# Patient Record
Sex: FEMALE | Race: ASIAN | Employment: OTHER | URBAN - METROPOLITAN AREA
[De-identification: names, ages, dates, MRNs, and addresses within clinical notes are randomized per-mention and may not be internally consistent; named-entity substitution may affect disease eponyms.]

---

## 2018-11-23 ENCOUNTER — ED HISTORICAL/CONVERTED ENCOUNTER (OUTPATIENT)
Dept: OTHER | Age: 83
End: 2018-11-23

## 2021-10-13 ENCOUNTER — APPOINTMENT (OUTPATIENT)
Dept: GENERAL RADIOLOGY | Age: 86
DRG: 871 | End: 2021-10-13
Attending: STUDENT IN AN ORGANIZED HEALTH CARE EDUCATION/TRAINING PROGRAM
Payer: MEDICARE

## 2021-10-13 ENCOUNTER — HOSPITAL ENCOUNTER (INPATIENT)
Age: 86
LOS: 2 days | Discharge: LEFT BEFORE DISCHARGE | DRG: 871 | End: 2021-10-15
Attending: INTERNAL MEDICINE | Admitting: INTERNAL MEDICINE
Payer: MEDICARE

## 2021-10-13 DIAGNOSIS — N30.00 ACUTE CYSTITIS WITHOUT HEMATURIA: Primary | ICD-10-CM

## 2021-10-13 PROBLEM — G93.41 METABOLIC ENCEPHALOPATHY: Status: ACTIVE | Noted: 2021-10-13

## 2021-10-13 PROBLEM — N39.0 UTI (URINARY TRACT INFECTION): Status: ACTIVE | Noted: 2021-10-13

## 2021-10-13 PROBLEM — A41.9 SEPSIS (HCC): Status: ACTIVE | Noted: 2021-10-13

## 2021-10-13 PROBLEM — G93.49 INFECTIOUS ENCEPHALOPATHY: Status: ACTIVE | Noted: 2021-10-13

## 2021-10-13 PROBLEM — B99.9 INFECTIOUS ENCEPHALOPATHY: Status: ACTIVE | Noted: 2021-10-13

## 2021-10-13 PROBLEM — T50.B95A: Status: ACTIVE | Noted: 2021-10-13

## 2021-10-13 PROBLEM — R41.82 ALTERED MENTAL STATUS: Status: ACTIVE | Noted: 2021-10-13

## 2021-10-13 LAB
ALBUMIN SERPL-MCNC: 3 G/DL (ref 3.5–5)
ALBUMIN/GLOB SERPL: 0.6 {RATIO} (ref 1.1–2.2)
ALP SERPL-CCNC: 79 U/L (ref 45–117)
ALT SERPL-CCNC: 25 U/L (ref 12–78)
ANION GAP SERPL CALC-SCNC: 7 MMOL/L (ref 5–15)
APPEARANCE UR: CLEAR
AST SERPL W P-5'-P-CCNC: 64 U/L (ref 15–37)
ATRIAL RATE: 111 BPM
BACTERIA URNS QL MICRO: NEGATIVE /HPF
BASOPHILS # BLD: 0 K/UL (ref 0–0.1)
BASOPHILS NFR BLD: 0 % (ref 0–1)
BILIRUB SERPL-MCNC: 2.1 MG/DL (ref 0.2–1)
BILIRUB UR QL: NEGATIVE
BUN SERPL-MCNC: 13 MG/DL (ref 6–20)
BUN/CREAT SERPL: 18 (ref 12–20)
CA-I BLD-MCNC: 9 MG/DL (ref 8.5–10.1)
CALCULATED R AXIS, ECG10: 35 DEGREES
CALCULATED T AXIS, ECG11: 142 DEGREES
CHLORIDE SERPL-SCNC: 111 MMOL/L (ref 97–108)
CO2 SERPL-SCNC: 24 MMOL/L (ref 21–32)
COLOR UR: ABNORMAL
COVID-19 RAPID TEST, COVR: NOT DETECTED
CREAT SERPL-MCNC: 0.71 MG/DL (ref 0.55–1.02)
CRP SERPL-MCNC: 5.88 MG/DL (ref 0–0.6)
D DIMER PPP FEU-MCNC: 1.13 UG/ML(FEU)
DIAGNOSIS, 93000: NORMAL
DIFFERENTIAL METHOD BLD: ABNORMAL
DIGOXIN SERPL-MCNC: 0.5 NG/ML (ref 0.9–2)
EOSINOPHIL # BLD: 0 K/UL (ref 0–0.4)
EOSINOPHIL NFR BLD: 0 % (ref 0–7)
ERYTHROCYTE [DISTWIDTH] IN BLOOD BY AUTOMATED COUNT: 14.6 % (ref 11.5–14.5)
FLUAV AG NPH QL IA: NEGATIVE
FLUBV AG NOSE QL IA: NEGATIVE
GLOBULIN SER CALC-MCNC: 4.7 G/DL (ref 2–4)
GLUCOSE SERPL-MCNC: 116 MG/DL (ref 65–100)
GLUCOSE UR STRIP.AUTO-MCNC: NEGATIVE MG/DL
HCT VFR BLD AUTO: 33.5 % (ref 35–47)
HGB BLD-MCNC: 10.8 G/DL (ref 11.5–16)
HGB UR QL STRIP: NEGATIVE
IMM GRANULOCYTES # BLD AUTO: 0.1 K/UL (ref 0–0.04)
IMM GRANULOCYTES NFR BLD AUTO: 1 % (ref 0–0.5)
KETONES UR QL STRIP.AUTO: NEGATIVE MG/DL
LACTATE SERPL-SCNC: 1.5 MMOL/L (ref 0.4–2)
LEUKOCYTE ESTERASE UR QL STRIP.AUTO: ABNORMAL
LYMPHOCYTES # BLD: 0.4 K/UL (ref 0.8–3.5)
LYMPHOCYTES NFR BLD: 3 % (ref 12–49)
MCH RBC QN AUTO: 31.8 PG (ref 26–34)
MCHC RBC AUTO-ENTMCNC: 32.2 G/DL (ref 30–36.5)
MCV RBC AUTO: 98.5 FL (ref 80–99)
MONOCYTES # BLD: 0.9 K/UL (ref 0–1)
MONOCYTES NFR BLD: 6 % (ref 5–13)
MUCOUS THREADS URNS QL MICRO: ABNORMAL /LPF
NEUTS SEG # BLD: 12.7 K/UL (ref 1.8–8)
NEUTS SEG NFR BLD: 90 % (ref 32–75)
NITRITE UR QL STRIP.AUTO: NEGATIVE
NRBC # BLD: 0 K/UL (ref 0–0.01)
NRBC BLD-RTO: 0 PER 100 WBC
PH UR STRIP: 6 [PH] (ref 5–8)
PLATELET # BLD AUTO: 225 K/UL (ref 150–400)
PMV BLD AUTO: 9.7 FL (ref 8.9–12.9)
POTASSIUM SERPL-SCNC: 3.7 MMOL/L (ref 3.5–5.1)
PROT SERPL-MCNC: 7.7 G/DL (ref 6.4–8.2)
PROT UR STRIP-MCNC: NEGATIVE MG/DL
Q-T INTERVAL, ECG07: 288 MS
QRS DURATION, ECG06: 88 MS
QTC CALCULATION (BEZET), ECG08: 398 MS
RBC # BLD AUTO: 3.4 M/UL (ref 3.8–5.2)
RBC #/AREA URNS HPF: ABNORMAL /HPF (ref 0–5)
SODIUM SERPL-SCNC: 142 MMOL/L (ref 136–145)
SP GR UR REFRACTOMETRY: 1.01 (ref 1–1.03)
SPECIMEN SOURCE: NORMAL
UA: UC IF INDICATED,UAUC: ABNORMAL
UROBILINOGEN UR QL STRIP.AUTO: 2 EU/DL (ref 0.1–1)
VENTRICULAR RATE, ECG03: 115 BPM
WBC # BLD AUTO: 14.1 K/UL (ref 3.6–11)
WBC URNS QL MICRO: ABNORMAL /HPF (ref 0–4)

## 2021-10-13 PROCEDURE — 36415 COLL VENOUS BLD VENIPUNCTURE: CPT

## 2021-10-13 PROCEDURE — 99285 EMERGENCY DEPT VISIT HI MDM: CPT

## 2021-10-13 PROCEDURE — 85379 FIBRIN DEGRADATION QUANT: CPT

## 2021-10-13 PROCEDURE — 74011000258 HC RX REV CODE- 258: Performed by: INTERNAL MEDICINE

## 2021-10-13 PROCEDURE — 80162 ASSAY OF DIGOXIN TOTAL: CPT

## 2021-10-13 PROCEDURE — 81001 URINALYSIS AUTO W/SCOPE: CPT

## 2021-10-13 PROCEDURE — 74011250637 HC RX REV CODE- 250/637: Performed by: INTERNAL MEDICINE

## 2021-10-13 PROCEDURE — 74011250636 HC RX REV CODE- 250/636: Performed by: INTERNAL MEDICINE

## 2021-10-13 PROCEDURE — 87635 SARS-COV-2 COVID-19 AMP PRB: CPT

## 2021-10-13 PROCEDURE — 80053 COMPREHEN METABOLIC PANEL: CPT

## 2021-10-13 PROCEDURE — 74011250636 HC RX REV CODE- 250/636: Performed by: STUDENT IN AN ORGANIZED HEALTH CARE EDUCATION/TRAINING PROGRAM

## 2021-10-13 PROCEDURE — 86140 C-REACTIVE PROTEIN: CPT

## 2021-10-13 PROCEDURE — 71045 X-RAY EXAM CHEST 1 VIEW: CPT

## 2021-10-13 PROCEDURE — 74011250636 HC RX REV CODE- 250/636: Performed by: PHYSICIAN ASSISTANT

## 2021-10-13 PROCEDURE — 65270000029 HC RM PRIVATE

## 2021-10-13 PROCEDURE — 74011250637 HC RX REV CODE- 250/637: Performed by: STUDENT IN AN ORGANIZED HEALTH CARE EDUCATION/TRAINING PROGRAM

## 2021-10-13 PROCEDURE — 87804 INFLUENZA ASSAY W/OPTIC: CPT

## 2021-10-13 PROCEDURE — 87040 BLOOD CULTURE FOR BACTERIA: CPT

## 2021-10-13 PROCEDURE — 83605 ASSAY OF LACTIC ACID: CPT

## 2021-10-13 PROCEDURE — 87086 URINE CULTURE/COLONY COUNT: CPT

## 2021-10-13 PROCEDURE — 93005 ELECTROCARDIOGRAM TRACING: CPT

## 2021-10-13 PROCEDURE — 85025 COMPLETE CBC W/AUTO DIFF WBC: CPT

## 2021-10-13 RX ORDER — FAMOTIDINE 20 MG/1
20 TABLET, FILM COATED ORAL 2 TIMES DAILY
Status: DISCONTINUED | OUTPATIENT
Start: 2021-10-13 | End: 2021-10-15 | Stop reason: HOSPADM

## 2021-10-13 RX ORDER — SAME BUTANEDISULFONATE/BETAINE 400-600 MG
250 POWDER IN PACKET (EA) ORAL 2 TIMES DAILY
Status: DISCONTINUED | OUTPATIENT
Start: 2021-10-13 | End: 2021-10-15 | Stop reason: HOSPADM

## 2021-10-13 RX ORDER — SODIUM CHLORIDE 0.9 % (FLUSH) 0.9 %
5-40 SYRINGE (ML) INJECTION EVERY 8 HOURS
Status: DISCONTINUED | OUTPATIENT
Start: 2021-10-13 | End: 2021-10-15 | Stop reason: HOSPADM

## 2021-10-13 RX ORDER — ACETAMINOPHEN 650 MG/1
650 SUPPOSITORY RECTAL
Status: DISCONTINUED | OUTPATIENT
Start: 2021-10-13 | End: 2021-10-15 | Stop reason: HOSPADM

## 2021-10-13 RX ORDER — HYDRALAZINE HYDROCHLORIDE 20 MG/ML
10 INJECTION INTRAMUSCULAR; INTRAVENOUS
Status: DISCONTINUED | OUTPATIENT
Start: 2021-10-13 | End: 2021-10-15 | Stop reason: HOSPADM

## 2021-10-13 RX ORDER — MAG HYDROX/ALUMINUM HYD/SIMETH 200-200-20
30 SUSPENSION, ORAL (FINAL DOSE FORM) ORAL
Status: DISCONTINUED | OUTPATIENT
Start: 2021-10-13 | End: 2021-10-15 | Stop reason: HOSPADM

## 2021-10-13 RX ORDER — ENOXAPARIN SODIUM 100 MG/ML
40 INJECTION SUBCUTANEOUS DAILY
Status: DISCONTINUED | OUTPATIENT
Start: 2021-10-14 | End: 2021-10-15 | Stop reason: HOSPADM

## 2021-10-13 RX ORDER — SODIUM CHLORIDE 0.9 % (FLUSH) 0.9 %
5-40 SYRINGE (ML) INJECTION AS NEEDED
Status: DISCONTINUED | OUTPATIENT
Start: 2021-10-13 | End: 2021-10-15 | Stop reason: HOSPADM

## 2021-10-13 RX ORDER — ONDANSETRON 4 MG/1
4 TABLET, ORALLY DISINTEGRATING ORAL
Status: DISCONTINUED | OUTPATIENT
Start: 2021-10-13 | End: 2021-10-15 | Stop reason: HOSPADM

## 2021-10-13 RX ORDER — ACETAMINOPHEN 650 MG/1
975 SUPPOSITORY RECTAL ONCE
Status: COMPLETED | OUTPATIENT
Start: 2021-10-13 | End: 2021-10-13

## 2021-10-13 RX ORDER — SODIUM CHLORIDE 9 MG/ML
100 INJECTION, SOLUTION INTRAVENOUS CONTINUOUS
Status: DISCONTINUED | OUTPATIENT
Start: 2021-10-13 | End: 2021-10-15 | Stop reason: HOSPADM

## 2021-10-13 RX ORDER — POLYETHYLENE GLYCOL 3350 17 G/17G
17 POWDER, FOR SOLUTION ORAL DAILY PRN
Status: DISCONTINUED | OUTPATIENT
Start: 2021-10-13 | End: 2021-10-15 | Stop reason: HOSPADM

## 2021-10-13 RX ORDER — ONDANSETRON 2 MG/ML
4 INJECTION INTRAMUSCULAR; INTRAVENOUS
Status: DISCONTINUED | OUTPATIENT
Start: 2021-10-13 | End: 2021-10-15 | Stop reason: HOSPADM

## 2021-10-13 RX ORDER — ACETAMINOPHEN 325 MG/1
650 TABLET ORAL
Status: DISCONTINUED | OUTPATIENT
Start: 2021-10-13 | End: 2021-10-15 | Stop reason: HOSPADM

## 2021-10-13 RX ORDER — LANOLIN ALCOHOL/MO/W.PET/CERES
3 CREAM (GRAM) TOPICAL
Status: DISCONTINUED | OUTPATIENT
Start: 2021-10-13 | End: 2021-10-15 | Stop reason: HOSPADM

## 2021-10-13 RX ADMIN — Medication 10 ML: at 20:02

## 2021-10-13 RX ADMIN — SODIUM CHLORIDE 1000 ML: 9 INJECTION, SOLUTION INTRAVENOUS at 08:26

## 2021-10-13 RX ADMIN — Medication 250 MG: at 20:01

## 2021-10-13 RX ADMIN — PIPERACILLIN AND TAZOBACTAM 3.38 G: 3; .375 INJECTION, POWDER, LYOPHILIZED, FOR SOLUTION INTRAVENOUS at 14:03

## 2021-10-13 RX ADMIN — PIPERACILLIN AND TAZOBACTAM 3.38 G: 3; .375 INJECTION, POWDER, LYOPHILIZED, FOR SOLUTION INTRAVENOUS at 21:46

## 2021-10-13 RX ADMIN — SODIUM CHLORIDE 1000 ML: 9 INJECTION, SOLUTION INTRAVENOUS at 10:45

## 2021-10-13 RX ADMIN — Medication 10 ML: at 21:46

## 2021-10-13 RX ADMIN — FAMOTIDINE 20 MG: 20 TABLET, FILM COATED ORAL at 20:01

## 2021-10-13 RX ADMIN — Medication 10 ML: at 14:01

## 2021-10-13 RX ADMIN — SODIUM CHLORIDE 100 ML/HR: 9 INJECTION, SOLUTION INTRAVENOUS at 14:01

## 2021-10-13 RX ADMIN — ACETAMINOPHEN 975 MG: 650 SUPPOSITORY RECTAL at 08:26

## 2021-10-13 NOTE — ACP (ADVANCE CARE PLANNING)
Advance Care Planning   Healthcare Decision Maker:       Primary Decision Maker: [facility] - [phone]

## 2021-10-13 NOTE — H&P
History & Physical    Primary Care Provider: None  Source of Information: Patient/Family/records    History of Presenting Illness:   Cathy Christy is a 81 y.o. female who presents with a history of atrial fibrillation who presents to the emergency department short of breath, febrile with altered mentation. Patient is alert now though quite weak. She is seemingly lucid though limited information is gathered from her and she asked me to call her  for information as she does not feel like talking. I spoke with her son at home who states that she was doing fine through yesterday. She got her second dose of COVID-19 vaccine apparently went to bed okay. Son states that he saw her eating a sweet potato at approximately 2:00 in the morning and then at about 5:55 in the morning encountered her short of breath and confused and this is when they called EMS. She was found to have hypoxia with SPO2 around 85% reportedly prior to hospitalization and here in the ED her O2 sat was 91% on room air. She is now on 2 L per nasal cannula saturating above 96%. She had a fever of 102.7 here initially. She denies chest pain, shortness of breath, abdominal pain, nausea, vomiting, diarrhea or headache. Labs reveal a white count of 14. 1,Potassium of 3.7, BUN/creatinine of 13 and 0.1 respectively with a marginal increase of AST to 64 and bili to 2.1. Her chest x-ray is unrevealing for acute cardiopulmonary process. Influenza a and B- as was COVID-19 rapid test not detected. Urine analysis is suggestive of UTI and in lieu of her fever, sepsis versus SIRS, likely Covid vaccination side effects, she is being admitted for further evaluation and management. Patient denies any chest pain, wheezing. She was well up until this morning. In speaking with family members, they deny any history of cardiac disease or irregular rhythms and cannot tell me why she is on digoxin.  PCPs office is closed currently and will try again in order to obtain further information on her history and current medications. Review of Systems:  A comprehensive review of systems was negative except for that written in the History of Present Illness. No past medical history on file. Family denies heart disease, hypertension  No past surgical history on file. Unknown yet  Prior to Admission medications    Not yet confirmed, reportedly on digoxin, lasix, potassium supplements. No Known Allergies   No family history on file. Unknown yet. SOCIAL HISTORY:  Patient resides:  Independently x   Assisted Living    SNF    With family care       Smoking history:   None x   Former    Chronic      Alcohol history:   None x   Social    Chronic      Ambulates:   Independently x   w/cane    w/walker    w/wc    CODE STATUS:  DNR    Full x   Other      Objective:     Physical Exam:     Visit Vitals  /62   Pulse 91   Temp 97.6 °F (36.4 °C)   Resp 24   Ht 5' 4\" (1.626 m)   Wt 72.6 kg (160 lb)   SpO2 97%   BMI 27.46 kg/m²    O2 Flow Rate (L/min): 2 l/min O2 Device: Nasal cannula    General:   Appears somnolent, quite weak, not distressed. She is interacting with me appropriately just very weak   Head:  Normocephalic, without obvious abnormality, atraumatic. Eyes:  Conjunctivae/corneas clear. EOMs intact. Throat:  Dry oral mucosa   Neck: Supple, symmetrical, trachea midline, no adenopathy, thyroid: no enlargement/tenderness/nodules, no carotid bruit and no JVD. Lungs:   Clear to auscultation bilaterally. Not labored. Heart:  Regular rate and rhythm, S1, S2 normal, no murmur, click, rub or gallop. Abdomen:   Soft, non-tender. Bowel sounds normal. No masses,  No organomegaly. Extremities: Extremities normal, atraumatic, no cyanosis or edema. Pulses: 2+ and symmetric all extremities. Skin:  Warm, dry, decreased turgor   Neurologic: CNII-XII intact. No motor or sensory deficits.       Data Review:     Recent Days:  Recent Labs 10/13/21  0935   WBC 14.1*   HGB 10.8*   HCT 33.5*        Recent Labs     10/13/21  0826      K 3.7   *   CO2 24   *   BUN 13   CREA 0.71   CA 9.0   ALB 3.0*   TBILI 2.1*   ALT 25     No results for input(s): PH, PCO2, PO2, HCO3, FIO2 in the last 72 hours. 24 Hour Results:  Recent Results (from the past 24 hour(s))   METABOLIC PANEL, COMPREHENSIVE    Collection Time: 10/13/21  8:26 AM   Result Value Ref Range    Sodium 142 136 - 145 mmol/L    Potassium 3.7 3.5 - 5.1 mmol/L    Chloride 111 (H) 97 - 108 mmol/L    CO2 24 21 - 32 mmol/L    Anion gap 7 5 - 15 mmol/L    Glucose 116 (H) 65 - 100 mg/dL    BUN 13 6 - 20 mg/dL    Creatinine 0.71 0.55 - 1.02 mg/dL    BUN/Creatinine ratio 18 12 - 20      GFR est AA >60 >60 ml/min/1.73m2    GFR est non-AA >60 >60 ml/min/1.73m2    Calcium 9.0 8.5 - 10.1 mg/dL    Bilirubin, total 2.1 (H) 0.2 - 1.0 mg/dL    AST (SGOT) 64 (H) 15 - 37 U/L    ALT (SGPT) 25 12 - 78 U/L    Alk.  phosphatase 79 45 - 117 U/L    Protein, total 7.7 6.4 - 8.2 g/dL    Albumin 3.0 (L) 3.5 - 5.0 g/dL    Globulin 4.7 (H) 2.0 - 4.0 g/dL    A-G Ratio 0.6 (L) 1.1 - 2.2     URINALYSIS W/ REFLEX CULTURE    Collection Time: 10/13/21  8:26 AM    Specimen: Urine   Result Value Ref Range    Color Yellow/Straw      Appearance Clear Clear      Specific gravity 1.012 1.003 - 1.030      pH (UA) 6.0 5.0 - 8.0      Protein Negative Negative mg/dL    Glucose Negative Negative mg/dL    Ketone Negative Negative mg/dL    Bilirubin Negative Negative      Blood Negative Negative      Urobilinogen 2.0 (H) 0.1 - 1.0 EU/dL    Nitrites Negative Negative      Leukocyte Esterase Moderate (A) Negative      UA:UC IF INDICATED Urine Culture Ordered (A) Culture not indicated by UA result      WBC 20-50 0 - 4 /hpf    RBC 0-5 0 - 5 /hpf    Bacteria Negative Negative /hpf    Mucus Trace /lpf   CULTURE, BLOOD, PAIRED    Collection Time: 10/13/21  8:26 AM    Specimen: Blood   Result Value Ref Range    Special Requests: No Special Requests      Culture result: No growth after 2 hours     LACTIC ACID    Collection Time: 10/13/21  8:26 AM   Result Value Ref Range    Lactic acid 1.5 0.4 - 2.0 mmol/L   INFLUENZA A & B AG (RAPID TEST)    Collection Time: 10/13/21  8:30 AM   Result Value Ref Range    Influenza A Antigen Negative Negative      Influenza B Antigen Negative Negative     COVID-19 RAPID TEST    Collection Time: 10/13/21  8:45 AM   Result Value Ref Range    Specimen source Nasopharyngeal      COVID-19 rapid test Not Detected Not Detected     CBC WITH AUTOMATED DIFF    Collection Time: 10/13/21  9:35 AM   Result Value Ref Range    WBC 14.1 (H) 3.6 - 11.0 K/uL    RBC 3.40 (L) 3.80 - 5.20 M/uL    HGB 10.8 (L) 11.5 - 16.0 g/dL    HCT 33.5 (L) 35.0 - 47.0 %    MCV 98.5 80.0 - 99.0 FL    MCH 31.8 26.0 - 34.0 PG    MCHC 32.2 30.0 - 36.5 g/dL    RDW 14.6 (H) 11.5 - 14.5 %    PLATELET 545 983 - 167 K/uL    MPV 9.7 8.9 - 12.9 FL    NRBC 0.0 0.0  WBC    ABSOLUTE NRBC 0.00 0.00 - 0.01 K/uL    NEUTROPHILS 90 (H) 32 - 75 %    LYMPHOCYTES 3 (L) 12 - 49 %    MONOCYTES 6 5 - 13 %    EOSINOPHILS 0 0 - 7 %    BASOPHILS 0 0 - 1 %    IMMATURE GRANULOCYTES 1 (H) 0 - 0.5 %    ABS. NEUTROPHILS 12.7 (H) 1.8 - 8.0 K/UL    ABS. LYMPHOCYTES 0.4 (L) 0.8 - 3.5 K/UL    ABS. MONOCYTES 0.9 0.0 - 1.0 K/UL    ABS. EOSINOPHILS 0.0 0.0 - 0.4 K/UL    ABS. BASOPHILS 0.0 0.0 - 0.1 K/UL    ABS. IMM. GRANS. 0.1 (H) 0.00 - 0.04 K/UL    DF AUTOMATED           Imaging:     Assessment:     Active Problems:    UTI (urinary tract infection) (10/13/2021)      Altered mental status (10/13/2021)      Systemic adverse effect of COVID-19 vaccine (10/13/2021)         81-year-old female questionably with a history of atrial fibrillation now in sinus rhythm presents with altered mentation, shortness of breath, fever a day after she received her second Covid 19 vaccination shot. Concurrently, fever to 102.7 here and UA is suggestive of UTI.  She is profoundly weak, confusion seems to have dissipated. She does meet sepsis criteria with a white count of 14, heart rate greater than 90, fever of 102.7 and an apparent UTI. -Sepsis syndrome  -Probable UTI  -Suspect fever, shortness of breath, weakness secondary to her Covid-19-second shot on 10/12/2021.  -Family denies her having any history of irregular heart rhythms or cardiac disease though it appears she is on Lasix, digoxin, potassium supplements.  PCPs office is closed for lunch no further information currently.  -metabolic/infectious encephalopathy    Plan:     -IP admission anticipating at least a 2 night stay  -zosyn,   -follow ucs/blcs  -Judicious hydration  -Supportive care  -PT/OT and mobilize  -O2 as needed to maintain sats above 91%, wean as tolerated  -As needed albuterol  -Continue telemetry monitoring  -We will need to get a hold of PCP regarding medication reconciliation, current list of meds    VTEP: Lovenox  Full code  NOK:  Norma Harrington [phone]    Signed By: Henry Little MD     October 13, 2021

## 2021-10-13 NOTE — ED NOTES
TRANSFER - OUT REPORT:    Verbal report given to Donald Cruz RN(name) on [address]  being transferred to [facility](unit) for routine progression of care       Report consisted of patients Situation, Background, Assessment and   Recommendations(SBAR). Information from the following report(s) SBAR was reviewed with the receiving nurse. Lines:   Peripheral IV 10/13/21 Anterior;Right Forearm (Active)       Peripheral IV 10/13/21 Anterior; Left Hand (Active)        Opportunity for questions and clarification was provided.       Patient transported with:   [employer]

## 2021-10-13 NOTE — PROGRESS NOTES
Primary Nurse Abhay Edward RN and Pancho Villalta LPN  performed a dual skin assessment on this patient. Deon score is 20.

## 2021-10-13 NOTE — ACP (ADVANCE CARE PLANNING)
Advance Care Planning   Healthcare Decision Maker:       Primary Decision Maker: Gillian Gillette - Son - [phone]    Primary Decision Maker: Victoria Cheng - Spouse - [phone]

## 2021-10-13 NOTE — PROGRESS NOTES
10/13/21. CM spoke with pt's step son Marcia Colton @ [phone] - stated pt lives @ home with her  Yvonne Davila @ [phone]). Pt uses no DME & no home health. A family member to transport home upon discharge.

## 2021-10-13 NOTE — ED PROVIDER NOTES
EMERGENCY DEPARTMENT HISTORY AND PHYSICAL EXAM      Date: 10/13/2021  Patient Name: Lori Vences    History of Presenting Illness     Chief Complaint   Patient presents with    Altered mental status       History Provided By: Patient and EMS    HPI: Lori Vences is an 81 y.o. female with a past medical history significant for A fib who presents via EMS to the ED for evaluation of AMS. Pt's family report that she received her second dose of COVID vaccine yesterday as well as influenza vaccine, and shortly afterwards developed a fever, cough, fatigue, and malaise. Upon arrival pt is febrile to 102.7 F, tachypneic, and with an oxygen saturation of 91% on RA. She seems confused but able to articulate c/o low back pain and dysuria. She has no further complaints of pain and specifically denies any chest pain, shortness of breath, abdominal pain, nausea, vomit, diarrhea, or headache. There are no other complaints, changes, or physical findings at this time. PCP: None    No current facility-administered medications on file prior to encounter. No current outpatient medications on file prior to encounter. Past History     Past Medical History:  No past medical history on file. Past Surgical History:  No past surgical history on file. Family History:  No family history on file. Social History:  Social History     Tobacco Use    Smoking status: Not on file   Substance Use Topics    Alcohol use: Not on file    Drug use: Not on file       Allergies:  No Known Allergies      Review of Systems     Review of Systems   Constitutional: Positive for fatigue and fever. Negative for diaphoresis. Malaise   HENT: Negative. Negative for ear pain, rhinorrhea and trouble swallowing. Eyes: Negative. Negative for pain. Respiratory: Positive for cough. Negative for chest tightness and wheezing. Cardiovascular: Negative. Negative for chest pain, palpitations and leg swelling. Gastrointestinal: Negative. Negative for abdominal pain, constipation, diarrhea, nausea and vomiting. Genitourinary: Positive for dysuria. Negative for flank pain, hematuria and urgency. Musculoskeletal: Positive for back pain. Negative for neck pain and neck stiffness. Skin: Negative. Negative for rash. Neurological: Negative. Negative for dizziness, syncope, weakness, light-headedness, numbness and headaches. All other systems reviewed and are negative. Physical Exam     Physical Exam  Vitals and nursing note reviewed. Constitutional:       General: She is not in acute distress. Appearance: Normal appearance. She is ill-appearing. HENT:      Head: Normocephalic and atraumatic. Mouth/Throat:      Mouth: Mucous membranes are moist.      Pharynx: Oropharynx is clear. Eyes:      Extraocular Movements: Extraocular movements intact. Conjunctiva/sclera: Conjunctivae normal.      Pupils: Pupils are equal, round, and reactive to light. Cardiovascular:      Rate and Rhythm: Tachycardia present. Rhythm irregularly irregular. Heart sounds: Normal heart sounds. No murmur heard. No friction rub. No gallop. Pulmonary:      Effort: Tachypnea present. No respiratory distress. Breath sounds: Normal breath sounds. No wheezing, rhonchi or rales. Abdominal:      General: Bowel sounds are normal. There is no distension. Palpations: Abdomen is soft. Tenderness: There is no abdominal tenderness. There is no guarding or rebound. Musculoskeletal:      Cervical back: Neck supple. Right lower leg: No edema. Left lower leg: No edema. Lymphadenopathy:      Cervical: No cervical adenopathy. Skin:     General: Skin is warm and dry. Neurological:      General: No focal deficit present. Mental Status: She is alert. She is confused.    Psychiatric:         Mood and Affect: Mood normal.         Behavior: Behavior normal.         Lab and Diagnostic Study Results     Labs -     No results found for this or any previous visit (from the past 12 hour(s)). Radiologic Studies -   @lastxrresult@  CT Results  (Last 48 hours)    None        CXR Results  (Last 48 hours)    None            Medical Decision Making   - I am the first provider for this patient. - I reviewed the vital signs, available nursing notes, past medical history, past surgical history, family history and social history. - Initial assessment performed. The patients presenting problems have been discussed, and they are in agreement with the care plan formulated and outlined with them. I have encouraged them to ask questions as they arise throughout their visit. Vital Signs-Reviewed the patient's vital signs. No data found. Records Reviewed: Nursing Notes and Old Medical Records      ED Course:   10:26 AM  Pt reassessed and appears less confused and reports feeling a little better. She has been updated on all available results. Pt asking for some water to drink. Will order an additional liter of NS as well. She is no longer febrile but remains tachycardic and hypotensive. No new complaints or concerns. Resting comfortable in ED bed and A&Ox3. Consult Note:  12:49 PM  Claire Saldivar PA-C spoke with Dr. Vianey Marie,  internal medicine  Discussed pt's hx, disposition, and available diagnostic and imaging results. Reviewed care plans. Consultant agrees with plans and will admit the patient to their service. Provider Notes (Medical Decision Making):     MDM  Number of Diagnoses or Management Options  Acute cystitis without hematuria  Diagnosis management comments: 80 y/o female presents with fever and AMS, highly concerning for sepsis - suspected UTI as source. At this time, she has oxygen saturation in the low 90's on RA, is tachypneic and hypotensive. Will start IV fluids, antipyretics, and consult hospitalist for admission and empiric antibiotics.   Will watch closely with frequent reassessment. Have low suspicion for a GI, skin/soft tissue, or CNS source at this time, but will reconsider if initial workup is unremarkable. Will order basic labs, blood cultures, lactic acid, UA, CXR, EKG. Amount and/or Complexity of Data Reviewed  Clinical lab tests: ordered and reviewed  Tests in the radiology section of CPT®: ordered and reviewed  Tests in the medicine section of CPT®: ordered and reviewed  Review and summarize past medical records: yes  Discuss the patient with other providers: yes (Dr. Norma Sorensen)  Independent visualization of images, tracings, or specimens: yes           Procedures   Medical Decision Makingedical Decision Making  Performed by: Bruna Christine PA-C  PROCEDURES:  Procedures       Disposition   Disposition: Admitted to Floor Medical Floor the case was discussed with the admitting physician Dr. Carlos Taylor:  1. There are no discharge medications for this patient. 2.   Follow-up Information     Follow up With Specialties Details Why Contact Info    None    None ([phone]) Patient stated that they have no PCP          3. Return to ED if worse   4. Discharge Medication List as of 10/15/2021  6:26 PM      START taking these medications    Details   levoFLOXacin (LEVAQUIN) 500 mg tablet Take 1 Tablet by mouth daily for 7 days. Take initial dose tonight., Normal, Disp-7 Tablet, R-0      Saccharomyces boulardii (Florastor) 250 mg capsule Take 1 Capsule by mouth two (2) times a day for 7 days. , Normal, Disp-14 Capsule, R-0               Diagnosis     Clinical Impression:   1. Acute cystitis without hematuria        Attestations:    Bruna Christine PA-C    Please note that this dictation was completed with [employer], the [employer] voice recognition software. Quite often unanticipated grammatical, syntax, homophones, and other interpretive errors are inadvertently transcribed by the computer software. Please disregard these errors.   Please excuse any errors that have escaped final proofreading. Thank you.

## 2021-10-13 NOTE — PROGRESS NOTES
Reason for Admission:  UTI/AMS                     RUR Score:  9%                   Plan for utilizing home health: None @ this time/uses no DME. PCP: First and Last name:  Natalia Cm     Name of Practice:    Are you a current patient: Yes/No: Yes   Approximate date of last visit: Been a while since last visit. Can you participate in a virtual visit with your PCP:                   No  Current Advanced Directive/Advance Care Plan: Full Code      Healthcare Decision Maker:                Primary Decision Maker: Jackelin Blank - Son - [phone]    Primary Decision Maker: Linda Dominguez - Spouse - [phone]                  Transition of Care Plan:  D/C Plan is home with family & a family member will transport pt home.

## 2021-10-14 LAB
ALBUMIN SERPL-MCNC: 2.4 G/DL (ref 3.5–5)
ALBUMIN/GLOB SERPL: 0.7 {RATIO} (ref 1.1–2.2)
ALP SERPL-CCNC: 56 U/L (ref 45–117)
ALT SERPL-CCNC: 21 U/L (ref 12–78)
ANION GAP SERPL CALC-SCNC: 6 MMOL/L (ref 5–15)
AST SERPL W P-5'-P-CCNC: 33 U/L (ref 15–37)
BACTERIA SPEC CULT: ABNORMAL
BILIRUB DIRECT SERPL-MCNC: 0.7 MG/DL (ref 0–0.2)
BILIRUB SERPL-MCNC: 1 MG/DL (ref 0.2–1)
BUN SERPL-MCNC: 9 MG/DL (ref 6–20)
BUN/CREAT SERPL: 16 (ref 12–20)
CA-I BLD-MCNC: 7.6 MG/DL (ref 8.5–10.1)
CHLORIDE SERPL-SCNC: 112 MMOL/L (ref 97–108)
CO2 SERPL-SCNC: 24 MMOL/L (ref 21–32)
COLONY COUNT,CNT: 2000
COLONY COUNT,CNT: ABNORMAL
CREAT SERPL-MCNC: 0.56 MG/DL (ref 0.55–1.02)
ERYTHROCYTE [DISTWIDTH] IN BLOOD BY AUTOMATED COUNT: 14.9 % (ref 11.5–14.5)
GLOBULIN SER CALC-MCNC: 3.4 G/DL (ref 2–4)
GLUCOSE SERPL-MCNC: 88 MG/DL (ref 65–100)
HCT VFR BLD AUTO: 29 % (ref 35–47)
HGB BLD-MCNC: 9.3 G/DL (ref 11.5–16)
MAGNESIUM SERPL-MCNC: 2 MG/DL (ref 1.6–2.4)
MCH RBC QN AUTO: 32 PG (ref 26–34)
MCHC RBC AUTO-ENTMCNC: 32.1 G/DL (ref 30–36.5)
MCV RBC AUTO: 99.7 FL (ref 80–99)
NRBC # BLD: 0 K/UL (ref 0–0.01)
NRBC BLD-RTO: 0 PER 100 WBC
PLATELET # BLD AUTO: 191 K/UL (ref 150–400)
PMV BLD AUTO: 10.2 FL (ref 8.9–12.9)
POTASSIUM SERPL-SCNC: 3.5 MMOL/L (ref 3.5–5.1)
PROT SERPL-MCNC: 5.8 G/DL (ref 6.4–8.2)
RBC # BLD AUTO: 2.91 M/UL (ref 3.8–5.2)
SODIUM SERPL-SCNC: 142 MMOL/L (ref 136–145)
SPECIAL REQUESTS,SREQ: ABNORMAL
TSH SERPL DL<=0.05 MIU/L-ACNC: 3.33 UIU/ML (ref 0.36–3.74)
WBC # BLD AUTO: 9.5 K/UL (ref 3.6–11)

## 2021-10-14 PROCEDURE — 83735 ASSAY OF MAGNESIUM: CPT

## 2021-10-14 PROCEDURE — 97530 THERAPEUTIC ACTIVITIES: CPT

## 2021-10-14 PROCEDURE — 85027 COMPLETE CBC AUTOMATED: CPT

## 2021-10-14 PROCEDURE — 36415 COLL VENOUS BLD VENIPUNCTURE: CPT

## 2021-10-14 PROCEDURE — 65270000029 HC RM PRIVATE

## 2021-10-14 PROCEDURE — 84443 ASSAY THYROID STIM HORMONE: CPT

## 2021-10-14 PROCEDURE — 87040 BLOOD CULTURE FOR BACTERIA: CPT

## 2021-10-14 PROCEDURE — 80048 BASIC METABOLIC PNL TOTAL CA: CPT

## 2021-10-14 PROCEDURE — 80076 HEPATIC FUNCTION PANEL: CPT

## 2021-10-14 PROCEDURE — 74011000258 HC RX REV CODE- 258: Performed by: INTERNAL MEDICINE

## 2021-10-14 PROCEDURE — 97165 OT EVAL LOW COMPLEX 30 MIN: CPT

## 2021-10-14 PROCEDURE — 74011250637 HC RX REV CODE- 250/637: Performed by: INTERNAL MEDICINE

## 2021-10-14 PROCEDURE — 74011250636 HC RX REV CODE- 250/636: Performed by: INTERNAL MEDICINE

## 2021-10-14 PROCEDURE — 97161 PT EVAL LOW COMPLEX 20 MIN: CPT

## 2021-10-14 RX ADMIN — ACETAMINOPHEN 650 MG: 325 TABLET ORAL at 21:34

## 2021-10-14 RX ADMIN — PIPERACILLIN AND TAZOBACTAM 3.38 G: 3; .375 INJECTION, POWDER, LYOPHILIZED, FOR SOLUTION INTRAVENOUS at 13:20

## 2021-10-14 RX ADMIN — PIPERACILLIN AND TAZOBACTAM 3.38 G: 3; .375 INJECTION, POWDER, LYOPHILIZED, FOR SOLUTION INTRAVENOUS at 21:30

## 2021-10-14 RX ADMIN — PIPERACILLIN AND TAZOBACTAM 3.38 G: 3; .375 INJECTION, POWDER, LYOPHILIZED, FOR SOLUTION INTRAVENOUS at 05:02

## 2021-10-14 RX ADMIN — Medication 250 MG: at 08:22

## 2021-10-14 RX ADMIN — FAMOTIDINE 20 MG: 20 TABLET, FILM COATED ORAL at 21:30

## 2021-10-14 RX ADMIN — Medication 250 MG: at 21:30

## 2021-10-14 RX ADMIN — Medication 10 ML: at 13:20

## 2021-10-14 RX ADMIN — DOXYCYCLINE 100 MG: 100 INJECTION, POWDER, LYOPHILIZED, FOR SOLUTION INTRAVENOUS at 17:29

## 2021-10-14 RX ADMIN — Medication 10 ML: at 21:31

## 2021-10-14 RX ADMIN — FAMOTIDINE 20 MG: 20 TABLET, FILM COATED ORAL at 08:23

## 2021-10-14 RX ADMIN — ACETAMINOPHEN 650 MG: 325 TABLET ORAL at 15:21

## 2021-10-14 RX ADMIN — Medication 10 ML: at 05:06

## 2021-10-14 RX ADMIN — ENOXAPARIN SODIUM 40 MG: 40 INJECTION SUBCUTANEOUS at 08:23

## 2021-10-14 RX ADMIN — ACETAMINOPHEN 650 MG: 325 TABLET ORAL at 01:11

## 2021-10-14 NOTE — PROGRESS NOTES
Hospitalist Progress Note               Daily Progress Note: 10/14/2021      Subjective: The patient is seen for follow  up. Sitting up at bedside,  Subjectively better,  Afebrile,  Lucid,  bp trend improved. ucs prelim gnr  blcs prelim gpc in aerobic bottle. Problem List:  Problem List as of 10/14/2021 Date Reviewed: 10/13/2021        Codes Class Noted - Resolved    UTI (urinary tract infection) ICD-10-CM: N39.0  ICD-9-CM: 599.0  10/13/2021 - Present        Altered mental status ICD-10-CM: R41.82  ICD-9-CM: 780.97  10/13/2021 - Present        Systemic adverse effect of COVID-19 vaccine ICD-10-CM: T50. B95A  ICD-9-CM: E949.6  10/13/2021 - Present        * (Principal) Sepsis ([facility] [address] ICD-10-CM: A41.9  ICD-9-CM: 038.9, 995.91  10/13/2021 - Present        Infectious encephalopathy ICD-10-CM: G93.49, B99.9  ICD-9-CM: 348.39, 136.9  10/13/2021 - Present              Medications reviewed  Current Facility-Administered Medications   Medication Dose Route Frequency    sodium chloride (NS) flush 5-40 mL  5-40 mL IntraVENous Q8H    sodium chloride (NS) flush 5-40 mL  5-40 mL IntraVENous PRN    acetaminophen (TYLENOL) tablet 650 mg  650 mg Oral Q6H PRN    Or    acetaminophen (TYLENOL) suppository 650 mg  650 mg Rectal Q6H PRN    polyethylene glycol (MIRALAX) packet 17 g  17 g Oral DAILY PRN    ondansetron (ZOFRAN ODT) tablet 4 mg  4 mg Oral Q8H PRN    Or    ondansetron (ZOFRAN) injection 4 mg  4 mg IntraVENous Q6H PRN    enoxaparin (LOVENOX) injection 40 mg  40 mg SubCUTAneous DAILY    piperacillin-tazobactam (ZOSYN) 3.375 g in 0.9% sodium chloride (MBP/ADV) 100 mL MBP  3.375 g IntraVENous Q8H    famotidine (PEPCID) tablet 20 mg  20 mg Oral BID    hydrALAZINE (APRESOLINE) 20 mg/mL injection 10 mg  10 mg IntraVENous Q6H PRN    alum-mag hydroxide-simeth (MYLANTA) oral suspension 30 mL  30 mL Oral Q4H PRN    Saccharomyces boulardii (FLORASTOR) capsule 250 mg  250 mg Oral BID    melatonin tablet 3 mg  3 mg Oral QHS PRN    0.9% sodium chloride infusion  100 mL/hr IntraVENous CONTINUOUS       Review of Systems:   A comprehensive review of systems was negative except for that written in the HPI. Objective:   Physical Exam:     Visit Vitals  BP (!) 140/82   Pulse 72   Temp 98.2 °F (36.8 °C)   Resp 18   Ht 5' 4\" (1.626 m)   Wt 72.6 kg (160 lb)   SpO2 99%   BMI 27.46 kg/m²    O2 Flow Rate (L/min): 2 l/min O2 Device: None (Room air)    Temp (24hrs), Av °F (36.7 °C), Min:97.5 °F (36.4 °C), Max:98.5 °F (36.9 °C)    No intake/output data recorded. 10/12 190 - 10/14 0700  In:  [I.V.:]  Out: -     General:   Much more alert and interactive, lucid, nad   Head:  Normocephalic, without obvious abnormality, atraumatic. Eyes:  Conjunctivae/corneas clear. EOMs intact. Throat:  Dry oral mucosa   Neck: Supple, symmetrical, trachea midline, no adenopathy, thyroid: no enlargement/tenderness/nodules, no carotid bruit and no JVD. Lungs:   Clear to auscultation bilaterally. Not labored. Heart:  Regular rate and rhythm, S1, S2 normal, no murmur, click, rub or gallop. Abdomen:   Soft, non-tender. Bowel sounds normal. No masses,  No organomegaly. Extremities: Extremities normal, atraumatic, no cyanosis or edema. Pulses: 2+ and symmetric all extremities. Skin:  Warm, dry, decreased turgor   Neurologic: CNII-XII intact. No motor or sensory deficits. Data Review:       Recent Days:  Recent Labs     10/14/21  0327 10/13/21  0935   WBC 9.5 14.1*   HGB 9.3* 10.8*   HCT 29.0* 33.5*    225     Recent Labs     10/14/21  0327 10/13/21  0826    142   K 3.5 3.7   * 111*   CO2 24 24   GLU 88 116*   BUN 9 13   CREA 0.56 0.71   CA 7.6* 9.0   MG 2.0  --    ALB 2.4* 3.0*   TBILI 1.0 2.1*   ALT 21 25     No results for input(s): PH, PCO2, PO2, HCO3, FIO2 in the last 72 hours.     24 Hour Results:  Recent Results (from the past 24 hour(s))   C REACTIVE PROTEIN, QT    Collection Time: 10/13/21  7:35 PM   Result Value Ref Range    C-Reactive protein 5.88 (H) 0.00 - 0.60 mg/dL   D DIMER    Collection Time: 10/13/21  7:35 PM   Result Value Ref Range    D DIMER 1.13 (H) <0.50 ug/ml(FEU)   DIGOXIN    Collection Time: 10/13/21  7:35 PM   Result Value Ref Range    Digoxin level 0.5 (L) 0.90 - 2.0 ng/mL   METABOLIC PANEL, BASIC    Collection Time: 10/14/21  3:27 AM   Result Value Ref Range    Sodium 142 136 - 145 mmol/L    Potassium 3.5 3.5 - 5.1 mmol/L    Chloride 112 (H) 97 - 108 mmol/L    CO2 24 21 - 32 mmol/L    Anion gap 6 5 - 15 mmol/L    Glucose 88 65 - 100 mg/dL    BUN 9 6 - 20 mg/dL    Creatinine 0.56 0.55 - 1.02 mg/dL    BUN/Creatinine ratio 16 12 - 20      GFR est AA >60 >60 ml/min/1.73m2    GFR est non-AA >60 >60 ml/min/1.73m2    Calcium 7.6 (L) 8.5 - 10.1 mg/dL   MAGNESIUM    Collection Time: 10/14/21  3:27 AM   Result Value Ref Range    Magnesium 2.0 1.6 - 2.4 mg/dL   CBC W/O DIFF    Collection Time: 10/14/21  3:27 AM   Result Value Ref Range    WBC 9.5 3.6 - 11.0 K/uL    RBC 2.91 (L) 3.80 - 5.20 M/uL    HGB 9.3 (L) 11.5 - 16.0 g/dL    HCT 29.0 (L) 35.0 - 47.0 %    MCV 99.7 (H) 80.0 - 99.0 FL    MCH 32.0 26.0 - 34.0 PG    MCHC 32.1 30.0 - 36.5 g/dL    RDW 14.9 (H) 11.5 - 14.5 %    PLATELET 538 545 - 263 K/uL    MPV 10.2 8.9 - 12.9 FL    NRBC 0.0 0.0  WBC    ABSOLUTE NRBC 0.00 0.00 - 0.01 K/uL   TSH 3RD GENERATION    Collection Time: 10/14/21  3:27 AM   Result Value Ref Range    TSH 3.33 0.36 - 3.74 uIU/mL   HEPATIC FUNCTION PANEL    Collection Time: 10/14/21  3:27 AM   Result Value Ref Range    Protein, total 5.8 (L) 6.4 - 8.2 g/dL    Albumin 2.4 (L) 3.5 - 5.0 g/dL    Globulin 3.4 2.0 - 4.0 g/dL    A-G Ratio 0.7 (L) 1.1 - 2.2      Bilirubin, total 1.0 0.2 - 1.0 mg/dL    Bilirubin, direct 0.7 (H) 0.0 - 0.2 mg/dL    Alk.  phosphatase 56 45 - 117 U/L    AST (SGOT) 33 15 - 37 U/L    ALT (SGPT) 21 12 - 78 U/L           Assessment/ Plan:     Patient Active Problem List   Diagnosis Code    UTI (urinary tract infection) N39.0    Altered mental status R41.82    Systemic adverse effect of COVID-19 vaccine T50. B95A    Sepsis ([facility] [address] A41.9    Infectious encephalopathy G93.49, B80.5       81-year-old female questionably with a history of atrial fibrillation now in sinus rhythm presents with altered mentation, shortness of breath, fever a day after she received her second Covid 19 vaccination shot. Concurrently, fever to 102.7 here and UA is suggestive of UTI. She is profoundly weak, confusion seems to have dissipated. She does meet sepsis criteria with a white count of 14, heart rate greater than 90, fever of 102.7 and an apparent UTI. Admitted for further management. Day 2 more alert and subjectively improving. UCS + GGNR and blcs growing gpc in aerobic bottle- cultures repeated. Continues zosyn started 10/13. Adding doxycycline pending blcs result. WBC descending. CRP initially 5.88.       -Sepsis syndrome, improving  -UTI- prelim gnr  -Bacteremia vs contaminant: blcs initial 10/13 reporting gpc in aerobic bottle. -Suspect covid vaccine(2nd shot) 10/12 contributed to presenting s/s.  -pta med list pending.  -metabolic/infectious encephalopathy resolving     Plan:        -zosyn, ucs pending  -add doxycycline, follow blcs  -Judicious hydration  -Supportive care  -PT/OT appreciated, mobilize    -O2 as needed to maintain sats above 91%, wean as tolerated, doing well on room air currently  -As needed albuterol  -Continue telemetry monitoring  -pta medlist pending     VTEP: Lovenox  Full code  NOK:  Giovanna Bernard [phone], updated telephonically. Care Plan discussed with: Patient/Family and Nurse    Total time spent with patient: 30 minutes. This dictation was done by dragon, computer voice recognition software. Often unanticipated grammatical, syntax, phones and other interpretive errors are inadvertently transcribed. Please excuse errors that have escaped final proofreading.     Josse Alcantara MD

## 2021-10-14 NOTE — PROGRESS NOTES
PHYSICAL THERAPY EVALUATION/DISCHARGE  Patient: Kathie Bush [de-identified]81 y.o. female)  Date: 10/14/2021  Primary Diagnosis: UTI (urinary tract infection) [N39.0]  Altered mental status [R41.82]       Precautions: standard       ASSESSMENT  Pt is an 80 yo female admitted on 10/13/2021 for SOB, febrile with AMS; pt currently being treated for sepsis syndrome, probable UTI. Per medical records pt received her 2nd COVID vaccine the day prior to onset of symptoms including fever, SOB and weakness. PMH: afib. Pt A&O x 4. Per pt report pt resides with  in a 1 story home with 4 LAURE, bilateral handrails, pt was I for ADLS/IADLS, no AD with mobility prior to admission. DME: none. Based on the objective data described below, the patient presents at functional baseline for mobility and amb. Pt semi-supine in bed upon PT arrival, agreeable to evaluation. Pt required mod I for bed mobility, supine > sit and sit <> stand transfers. Pt amb 175 feet in hallway with gt belt, no AD (pushing her IV pole), and mod I; demonstrates good pratibha with steady step through gt pattern with no LOB or knee buckling noted, pt did kick wheel of IV pole with foot was able to self correct. Area inspected by PT after sitting in chair, no redness or broken skin noted. Pt did well with session today, with no c/o dizziness, SOB or pain throughout session. Pt has no skilled acute PT needs at this time noted by PT or reported by pt, will DC skilled PT following evaluation; pt verbalized understanding and agreement.      Current Level of Function Impacting Discharge (mobility/balance): mod I to I    Other factors to consider for discharge: at baseline     PLAN :  Recommendations and Planned Interventions: DC from skilled PT services following evaluation    Frequency/Duration: Patient will be followed by physical therapy: DC from services following evaluation due to pt being at functional baseline; no skilled therapy required during admission, please reorder if needed     Recommendation for discharge: (in order for the patient to meet his/her long term goals)  [address]    This discharge recommendation:  Has been made in collaboration with the attending provider and/or case management    IF patient discharges home will need the following DME: none       SUBJECTIVE:   Patient stated i'm ready to go home.     OBJECTIVE DATA SUMMARY:   HISTORY:    No past medical history on file. No past surgical history on file. Home Situation  Home Environment: Private residence  # Steps to Enter: 4  Rails to Enter: Yes  Hand Rails : Bilateral  One/Two Story Residence: One story  Support Systems: Spouse/Significant Other  Current DME Used/Available at Home: None    EXAMINATION/PRESENTATION/DECISION MAKING:   Critical Behavior:  Neurologic State: Alert  Orientation Level: Oriented X4        Skin:  Intact where visible  Edema: none noted   Range Of Motion:  AROM: Within functional limits           PROM: Within functional limits           Strength:    Strength: Within functional limits                 Functional Mobility:  Bed Mobility:  Rolling: Modified independent  Supine to Sit: Modified independent     Scooting: Modified independent  Transfers:  Sit to Stand: Independent  Stand to Sit: Independent                       Balance:   Sitting: Intact; Without support  Standing: Intact; Without support  Ambulation/Gait Training:  Distance (ft): 175 Feet (ft)  Assistive Device: Gait belt  Ambulation - Level of Assistance: Modified independent     Gait Description (WDL): Exceptions to WDL                         Therapeutic Exercises:   Not completed this session    Functional Measure:  [facility] Mobility Inpatient Short Form  How much difficulty does the patient currently have. .. Unable A Lot A Little None   1. Turning over in bed (including adjusting bedclothes, sheets and blankets)? [] 1   [] 2   [] 3   [x] 4   2.   Sitting down on and standing up from a chair with arms ( e.g., wheelchair, bedside commode, etc.)   [] 1   [] 2   [] 3   [x] 4   3. Moving from lying on back to sitting on the side of the bed? [] 1   [] 2   [] 3   [x] 4          How much help from another person does the patient currently need. .. Total A Lot A Little None   4. Moving to and from a bed to a chair (including a wheelchair)? [] 1   [] 2   [] 3   [x] 4   5. Need to walk in hospital room? [] 1   [] 2   [] 3   [x] 4   6. Climbing 3-5 steps with a railing? [] 1   [] 2   [] 3   [x] 4   © , Trustees of [address] [place] [address] [address] [address], under license to [employer]. All rights reserved     Score:  Initial:  Most Recent: X (Date: 10/14/21)   Interpretation of Tool:  Represents activities that are increasingly more difficult (i.e. Bed mobility, Transfers, Gait). Score 24 23 22-20 19-15 14-10 9-7 6   Modifier CH CI CJ CK CL CM CN          Physical Therapy Evaluation Charge Determination   History Examination Presentation Decision-Making   HIGH Complexity :3+ comorbidities / personal factors will impact the outcome/ POC  HIGH Complexity : 4+ Standardized tests and measures addressing body structure, function, activity limitation and / or participation in recreation  LOW Complexity : Stable, uncomplicated  Other outcome measures [facility] 6  low      Based on the above components, the patient evaluation is determined to be of the following complexity level: LOW     Pain Ratin/10 reported     Activity Tolerance:   WNL, tolerates ADLs without rest breaks and SpO2 stable on RA; sats on RA 98%    After treatment patient left in no apparent distress:   Sitting in chair and Call bell within reach and nsg updated      COMMUNICATION/EDUCATION:   The patients plan of care was discussed with: Occupational therapist and Registered nurse.      Fall prevention education was provided and the patient/caregiver indicated understanding., Patient/family have participated as able in goal setting and plan of care. and Patient/family agree to work toward stated goals and plan of care. PT/OT sessions occurred together for increased safety of pt and clinician.     Thank you for this referral.  Jani Estrada, PT, DPT   Time Calculation: 19 mins

## 2021-10-14 NOTE — PROGRESS NOTES
OCCUPATIONAL THERAPY EVALUATION/DISCHARGE  Patient: Kathie Bush [de-identified]81 y.o. female)  Date: 10/14/2021  Primary Diagnosis: UTI (urinary tract infection) [N39.0]  Altered mental status [R41.82]       Precautions: standard       ASSESSMENT  Pt is an 80 yo female admitted on 10/13/2021 for SOB, febrile with AMS; pt currently being treated for sepsis syndrome, probable UTI. Per medical records pt received her 2nd COVID vaccine the day prior to onset of symptoms including fever, SOB and weakness. PMH: afib. Pt A&O x 4. Per pt report pt resides with  in a 1 story home with 4 LAURE, bilateral handrails, pt was I for ADLS/IADLS, no AD with mobility prior to admission. DME: none.     Based on the objective data described below, the patient presents at functional baseline for ADLs/functional transfers and mobility. Pt received semi-supine in bed upon arrival, agreeable to OT and PT evaluations. Pt required mod I for bed mobility, supine > sit and sit <> stand transfers. Mod I simulated LB dressing seated EOB. Mod I bathroom mobility with gait belt donned, no AD pushing IV pole with no LOB, and Mod I standing sink side completing oral care with good UE AROM/strength and coordination observed. Pt left resting comfortably in chair at end of evaluation today, IND with self feeding/bringing drink to mouth. Overall, pt tolerates session well today; pt is currently observed and reported to be at functional baseline for ADLs/mobility with no further skilled OT needs indicated at this time. OT to therefore d/c from OT caseload, pt in agreement. Recommend home with family/self care once medically appropriate. Other factors to consider for discharge: family support, IND at baseline      PLAN :  Recommendation for discharge: (in order for the patient to meet his/her long term goals)  No skilled occupational therapy/ follow up rehabilitation needs identified at this time.     This discharge recommendation:  Has been made in collaboration with the attending provider and/or case management    IF patient discharges home will need the following DME: none       SUBJECTIVE:   Patient stated I'm ready to go home.     OBJECTIVE DATA SUMMARY:   HISTORY:   No past medical history on file. No past surgical history on file. Expanded or extensive additional review of patient history:   Home Situation  Home Environment: Private residence  # Steps to Enter: 4  Rails to Enter: Yes  Hand Rails : Bilateral  One/Two Story Residence: One story  Support Systems: Spouse/Significant Other  Current DME Used/Available at Home: None    EXAMINATION OF PERFORMANCE DEFICITS:  Cognitive/Behavioral Status:  Neurologic State: Alert  Orientation Level: Oriented X4    Range of Motion:  AROM: Within functional limits  PROM: Within functional limits    Strength:  Strength: Within functional limits    Coordination:     Fine Motor Skills-Upper: Left Intact; Right Intact    Gross Motor Skills-Upper: Left Intact; Right Intact    Balance:  Sitting: Intact; Without support  Standing: Intact; Without support    Functional Mobility and Transfers for ADLs:  Bed Mobility:  Rolling: Modified independent  Supine to Sit: Modified independent  Scooting: Modified independent    Transfers:  Sit to Stand: Independent  Stand to Sit: Independent  Bed to Chair: Independent  Bathroom Mobility: Modified independent    ADL Intervention and task modifications:  Feeding  Drink to Mouth: Independent    Grooming  Grooming Assistance: Modified independent  Position Performed: Standing  Brushing Teeth: Modified independent    Lower Body Dressing Assistance  Socks: Modified independent  Position Performed: Seated edge of bed (Simulated)    Functional Measure:    [address] [facility] [address] [address] AM-PACTM \"6 Clicks\"                                                       Daily Activity Inpatient Short Form  How much help from another person does the patient currently need. .. Total; A Lot A Little None   1.   Putting on and taking off regular lower body clothing? []  1 []  2 []  3 [x]  4   2. Bathing (including washing, rinsing, drying)? []  1 []  2 []  3 [x]  4   3. Toileting, which includes using toilet, bedpan or urinal? [] 1 []  2 []  3 [x]  4   4. Putting on and taking off regular upper body clothing? []  1 []  2 []  3 [x]  4   5. Taking care of personal grooming such as brushing teeth? []  1 []  2 []  3 [x]  4   6. Eating meals? []  1 []  2 []  3 [x]  4   © , Trustees of [facility] [employer], under license to [employer]. All rights reserved     Score:      Interpretation of Tool:  Represents clinically-significant functional categories (i.e. Activities of daily living). Percentage of Impairment CH    0%   CI    1-19% CJ    20-39% CK    40-59% CL    60-79% CM    80-99% CN     100%   [facility]  Score 6-24 24 23 20-22 15-19 10-14 7-9 6       Occupational Therapy Evaluation Charge Determination   History Examination Decision-Making   LOW Complexity : Brief history review  LOW Complexity : 1-3 performance deficits relating to physical, cognitive , or psychosocial skils that result in activity limitations and / or participation restrictions  LOW Complexity : No comorbidities that affect functional and no verbal or physical assistance needed to complete eval tasks       Based on the above components, the patient evaluation is determined to be of the following complexity level: LOW   Pain Ratin/10    Activity Tolerance:   WNL. After treatment patient left in no apparent distress:    Sitting in chair and Call bell within reach    COMMUNICATION/EDUCATION:   The patients plan of care was discussed with: Physical therapist and Registered nurse.      OT/PT sessions occurred together for increased patient and clinician safety    Thank you for this referral.  Haseeb Rodriguez  Time Calculation: 20 mins

## 2021-10-14 NOTE — PROGRESS NOTES
She remains on Zosyn 3.375g antibiotics every 8 hours for Acute cystitis and urinary tract infection. Vital signs have been stable and devoid of distress. Her call light and fluids remain encouraged.

## 2021-10-15 VITALS
BODY MASS INDEX: 27.31 KG/M2 | HEIGHT: 64 IN | HEART RATE: 70 BPM | OXYGEN SATURATION: 99 % | SYSTOLIC BLOOD PRESSURE: 153 MMHG | DIASTOLIC BLOOD PRESSURE: 76 MMHG | WEIGHT: 160 LBS | TEMPERATURE: 97.9 F | RESPIRATION RATE: 18 BRPM

## 2021-10-15 LAB
ANION GAP SERPL CALC-SCNC: 6 MMOL/L (ref 5–15)
BACTERIA SPEC CULT: ABNORMAL
BUN SERPL-MCNC: 7 MG/DL (ref 6–20)
BUN/CREAT SERPL: 15 (ref 12–20)
CA-I BLD-MCNC: 8.1 MG/DL (ref 8.5–10.1)
CHLORIDE SERPL-SCNC: 115 MMOL/L (ref 97–108)
CO2 SERPL-SCNC: 24 MMOL/L (ref 21–32)
CREAT SERPL-MCNC: 0.47 MG/DL (ref 0.55–1.02)
CRP SERPL-MCNC: 5.11 MG/DL (ref 0–0.6)
ERYTHROCYTE [DISTWIDTH] IN BLOOD BY AUTOMATED COUNT: 14.6 % (ref 11.5–14.5)
GLUCOSE SERPL-MCNC: 83 MG/DL (ref 65–100)
HCT VFR BLD AUTO: 30.4 % (ref 35–47)
HGB BLD-MCNC: 9.8 G/DL (ref 11.5–16)
MCH RBC QN AUTO: 32.1 PG (ref 26–34)
MCHC RBC AUTO-ENTMCNC: 32.2 G/DL (ref 30–36.5)
MCV RBC AUTO: 99.7 FL (ref 80–99)
NRBC # BLD: 0 K/UL (ref 0–0.01)
NRBC BLD-RTO: 0 PER 100 WBC
PLATELET # BLD AUTO: 203 K/UL (ref 150–400)
PMV BLD AUTO: 10.5 FL (ref 8.9–12.9)
POTASSIUM SERPL-SCNC: 3.5 MMOL/L (ref 3.5–5.1)
RBC # BLD AUTO: 3.05 M/UL (ref 3.8–5.2)
SODIUM SERPL-SCNC: 145 MMOL/L (ref 136–145)
SPECIAL REQUESTS,SREQ: ABNORMAL
WBC # BLD AUTO: 7.8 K/UL (ref 3.6–11)

## 2021-10-15 PROCEDURE — 74011000258 HC RX REV CODE- 258: Performed by: INTERNAL MEDICINE

## 2021-10-15 PROCEDURE — 86140 C-REACTIVE PROTEIN: CPT

## 2021-10-15 PROCEDURE — 74011250636 HC RX REV CODE- 250/636: Performed by: INTERNAL MEDICINE

## 2021-10-15 PROCEDURE — 80048 BASIC METABOLIC PNL TOTAL CA: CPT

## 2021-10-15 PROCEDURE — 74011250637 HC RX REV CODE- 250/637: Performed by: INTERNAL MEDICINE

## 2021-10-15 PROCEDURE — 36415 COLL VENOUS BLD VENIPUNCTURE: CPT

## 2021-10-15 PROCEDURE — 85027 COMPLETE CBC AUTOMATED: CPT

## 2021-10-15 RX ORDER — SAME BUTANEDISULFONATE/BETAINE 400-600 MG
250 POWDER IN PACKET (EA) ORAL 2 TIMES DAILY
Qty: 14 CAPSULE | Refills: 0 | Status: SHIPPED | OUTPATIENT
Start: 2021-10-15 | End: 2021-10-22

## 2021-10-15 RX ORDER — LEVOFLOXACIN 500 MG/1
500 TABLET, FILM COATED ORAL DAILY
Qty: 7 TABLET | Refills: 0 | Status: SHIPPED | OUTPATIENT
Start: 2021-10-15 | End: 2021-10-22

## 2021-10-15 RX ADMIN — PIPERACILLIN AND TAZOBACTAM 3.38 G: 3; .375 INJECTION, POWDER, LYOPHILIZED, FOR SOLUTION INTRAVENOUS at 05:16

## 2021-10-15 RX ADMIN — ACETAMINOPHEN 650 MG: 325 TABLET ORAL at 09:00

## 2021-10-15 RX ADMIN — Medication 10 ML: at 05:17

## 2021-10-15 RX ADMIN — Medication 250 MG: at 09:00

## 2021-10-15 RX ADMIN — FAMOTIDINE 20 MG: 20 TABLET, FILM COATED ORAL at 09:00

## 2021-10-15 RX ADMIN — ENOXAPARIN SODIUM 40 MG: 40 INJECTION SUBCUTANEOUS at 09:00

## 2021-10-15 RX ADMIN — DOXYCYCLINE 100 MG: 100 INJECTION, POWDER, LYOPHILIZED, FOR SOLUTION INTRAVENOUS at 05:17

## 2021-10-15 NOTE — PROGRESS NOTES
Hospitalist Progress Note               Daily Progress Note: 10/15/2021      Subjective: The patient is seen for follow  up. Resting, appears nad, appears weak. Denies specific complaints, wants to go home. BP stable, afebrile, room air 99%. Leukocytosis resolved. blcs 10/13 growing gpc in 2/4, repeated 10/14 no growth at 7 hrs. Urine cs growing GNR        Problem List:  Problem List as of 10/15/2021 Date Reviewed: 10/13/2021        Codes Class Noted - Resolved    UTI (urinary tract infection) ICD-10-CM: N39.0  ICD-9-CM: 599.0  10/13/2021 - Present        Altered mental status ICD-10-CM: R41.82  ICD-9-CM: 780.97  10/13/2021 - Present        Systemic adverse effect of COVID-19 vaccine ICD-10-CM: T50. B95A  ICD-9-CM: E949.6  10/13/2021 - Present        * (Principal) Sepsis ([facility] [place] [address] ICD-10-CM: A41.9  ICD-9-CM: 038.9, 995.91  10/13/2021 - Present        Infectious encephalopathy ICD-10-CM: G93.49, B99.9  ICD-9-CM: 348.39, 136.9  10/13/2021 - Present              Medications reviewed  Current Facility-Administered Medications   Medication Dose Route Frequency    doxycycline (VIBRAMYCIN) 100 mg in 0.9% sodium chloride (MBP/ADV) 100 mL MBP  100 mg IntraVENous Q12H    sodium chloride (NS) flush 5-40 mL  5-40 mL IntraVENous Q8H    sodium chloride (NS) flush 5-40 mL  5-40 mL IntraVENous PRN    acetaminophen (TYLENOL) tablet 650 mg  650 mg Oral Q6H PRN    Or    acetaminophen (TYLENOL) suppository 650 mg  650 mg Rectal Q6H PRN    polyethylene glycol (MIRALAX) packet 17 g  17 g Oral DAILY PRN    ondansetron (ZOFRAN ODT) tablet 4 mg  4 mg Oral Q8H PRN    Or    ondansetron (ZOFRAN) injection 4 mg  4 mg IntraVENous Q6H PRN    enoxaparin (LOVENOX) injection 40 mg  40 mg SubCUTAneous DAILY    piperacillin-tazobactam (ZOSYN) 3.375 g in 0.9% sodium chloride (MBP/ADV) 100 mL MBP  3.375 g IntraVENous Q8H    famotidine (PEPCID) tablet 20 mg  20 mg Oral BID    hydrALAZINE (APRESOLINE) 20 mg/mL injection 10 mg  10 mg IntraVENous Q6H PRN    alum-mag hydroxide-simeth (MYLANTA) oral suspension 30 mL  30 mL Oral Q4H PRN    Saccharomyces boulardii (FLORASTOR) capsule 250 mg  250 mg Oral BID    melatonin tablet 3 mg  3 mg Oral QHS PRN    0.9% sodium chloride infusion  100 mL/hr IntraVENous CONTINUOUS       Review of Systems:   A comprehensive review of systems was negative except for that written in the HPI. Objective:   Physical Exam:     Visit Vitals  BP (!) 153/76   Pulse 70   Temp 97.9 °F (36.6 °C)   Resp 18   Ht 5' 4\" (1.626 m)   Wt 72.6 kg (160 lb)   SpO2 99%   BMI 27.46 kg/m²    O2 Flow Rate (L/min): 2 l/min O2 Device: None (Room air)    Temp (24hrs), Av.9 °F (36.6 °C), Min:97.9 °F (36.6 °C), Max:97.9 °F (36.6 °C)    No intake/output data recorded. No intake/output data recorded. General:   alert, weak, nad   Head:  Normocephalic, without obvious abnormality, atraumatic. Eyes:  Conjunctivae/corneas clear. EOMs intact. Throat:  Moist oral mucosa   Neck: Supple, symmetrical, trachea midline, no adenopathy, thyroid: no enlargement/tenderness/nodules, no carotid bruit and no JVD. Lungs:   Clear to auscultation bilaterally. Not labored. Heart:  Regular rate and rhythm, S1, S2 normal, no murmur, click, rub or gallop. Abdomen:   Soft, non-tender. Bowel sounds normal. No masses,  No organomegaly. Extremities: Extremities normal, atraumatic, no cyanosis or edema. Pulses: 2+ and symmetric all extremities. Skin:  Warm, dry, decreased turgor   Neurologic: CNII-XII intact. No motor or sensory deficits.         Data Review:       Recent Days:  Recent Labs     10/15/21  0605 10/14/21  0327 10/13/21  0935   WBC 7.8 9.5 14.1*   HGB 9.8* 9.3* 10.8*   HCT 30.4* 29.0* 33.5*    191 225     Recent Labs     10/15/21  0605 10/14/21  0327 10/13/21  0826    142 142   K 3.5 3.5 3.7   * 112* 111*   CO2 24 24 24   GLU 83 88 116*   BUN 7 9 13   CREA 0.47* 0.56 0.71   CA 8.1* 7.6* 9.0   MG  --  2.0  --    ALB --  2.4* 3.0*   TBILI  --  1.0 2.1*   ALT  --  21 25     No results for input(s): PH, PCO2, PO2, HCO3, FIO2 in the last 72 hours. 24 Hour Results:  Recent Results (from the past 24 hour(s))   CULTURE, BLOOD, PAIRED    Collection Time: 10/14/21 10:15 PM    Specimen: Blood   Result Value Ref Range    Special Requests: Blood      Culture result: No growth after 7 hours     METABOLIC PANEL, BASIC    Collection Time: 10/15/21  6:05 AM   Result Value Ref Range    Sodium 145 136 - 145 mmol/L    Potassium 3.5 3.5 - 5.1 mmol/L    Chloride 115 (H) 97 - 108 mmol/L    CO2 24 21 - 32 mmol/L    Anion gap 6 5 - 15 mmol/L    Glucose 83 65 - 100 mg/dL    BUN 7 6 - 20 mg/dL    Creatinine 0.47 (L) 0.55 - 1.02 mg/dL    BUN/Creatinine ratio 15 12 - 20      GFR est AA >60 >60 ml/min/1.73m2    GFR est non-AA >60 >60 ml/min/1.73m2    Calcium 8.1 (L) 8.5 - 10.1 mg/dL   CBC W/O DIFF    Collection Time: 10/15/21  6:05 AM   Result Value Ref Range    WBC 7.8 3.6 - 11.0 K/uL    RBC 3.05 (L) 3.80 - 5.20 M/uL    HGB 9.8 (L) 11.5 - 16.0 g/dL    HCT 30.4 (L) 35.0 - 47.0 %    MCV 99.7 (H) 80.0 - 99.0 FL    MCH 32.1 26.0 - 34.0 PG    MCHC 32.2 30.0 - 36.5 g/dL    RDW 14.6 (H) 11.5 - 14.5 %    PLATELET 617 017 - 182 K/uL    MPV 10.5 8.9 - 12.9 FL    NRBC 0.0 0.0  WBC    ABSOLUTE NRBC 0.00 0.00 - 0.01 K/uL   C REACTIVE PROTEIN, QT    Collection Time: 10/15/21  6:05 AM   Result Value Ref Range    C-Reactive protein 5.11 (H) 0.00 - 0.60 mg/dL           Assessment/ Plan:     Patient Active Problem List   Diagnosis Code    UTI (urinary tract infection) N39.0    Altered mental status R41.82    Systemic adverse effect of COVID-19 vaccine T50. B95A    Sepsis ([facility] [address] A41.9    Infectious encephalopathy G93.49, B80.5       81-year-old female questionably with a history of atrial fibrillation now in sinus rhythm presents with altered mentation, shortness of breath, fever a day after she received her second Covid 19 vaccination shot.  Concurrently, fever to 102.7 here and UA is suggestive of UTI. She is profoundly weak, confusion seems to have dissipated. She does meet sepsis criteria with a white count of 14, heart rate greater than 90, fever of 102.7 and an apparent UTI. Admitted for further management. Day 2 more alert and subjectively improving. UCS + GGNR and blcs growing gpc in aerobic bottle- cultures repeated. Continues zosyn started 10/13. Added doxycycline pending blcs result. WBC descending. CRP initially 5.88 descended to 5.11       -Sepsis syndrome, improving  -UTI- prelim gnr  -Bacteremia vs contaminant: blcs initial 10/13 reporting gpc #2/4, repeat 10/14 no growth at 7 hours.   -Suspect covid vaccine(2nd shot) 10/12 contributed to presenting s/s.  -pta med list pending.  -metabolic/infectious encephalopathy resolving     Plan:        -zosyn, ucs and repeat blcs/ID's pending   -doxycycline, follow blcs  -Judicious hydration  -Supportive care  -PT/OT appreciated, mobilize , home self care rec'd  -O2 as needed to maintain sats above 91%, wean as tolerated, doing well on room air currently  -As needed albuterol  -pta medlist pending     VTEP: Lovenox  Full code  NOK: Akbar Mir [phone], updated telephonically. Dispo: pt is anxious to go home though ucs id pending. Concern for + initial blcs, awaiting repeat and ID. Suspect may be able to go home tomorrow if blcs repeat neg and ucs reported. Pt/ot rec home/self care    Care Plan discussed with: Patient/Family and Nurse    Total time spent with patient: 30 minutes. This dictation was done by dragon, computer voice recognition software. Often unanticipated grammatical, syntax, phones and other interpretive errors are inadvertently transcribed. Please excuse errors that have escaped final proofreading.     Elisabeth Orozco MD

## 2021-10-15 NOTE — DISCHARGE SUMMARY
Pt signed out ama. I called her and discussed with her son as well. She obviously has uti, had improved though ID and sensitivity pending. Complicating matters was positive blood culture and awaiting repeat blood cultures. Nonetheless, She need to complete abx course. I will e prescribe levofloxacin 500 mg daily x 7 days + probiotics. D/w son who will  tonight at [facility]. I will follow blood and urine cultures and let them know if abx appropriate or not. Advised to bring her back to ED if sx recur or worsen. Full dc summary to follow.

## 2021-10-16 NOTE — PROGRESS NOTES
Patient requesting to speak with attending regarding possible discharge today and frustrated that she has not been able to speak with him. Dr. Rodrick Hernandez notified via perfect serve of patient request, however, he is currently unavailable and will be up to see patient as soon as possible. Dr. Rodrick Hernandez did state that he is awaiting both blood and urine culture reports prior to discharging patient. Patient was updated on conversation with Dr. Rodrick Hernandez. Patient refusing to wait for MD and removed her own PIV. Site is clean, dry and intact without redness or swelling. Patient insisting on leaving against medical advice. Discussed risks involved with leaving AMA. Patient acknowledged risk and signed AMA form. Patient escorted down to private vehicle.

## 2021-10-21 LAB
BACTERIA SPEC CULT: NORMAL
SPECIAL REQUESTS,SREQ: NORMAL

## 2022-03-18 PROBLEM — R41.82 ALTERED MENTAL STATUS: Status: ACTIVE | Noted: 2021-10-13

## 2022-03-19 PROBLEM — T50.B95A: Status: ACTIVE | Noted: 2021-10-13

## 2022-03-19 PROBLEM — G93.49 INFECTIOUS ENCEPHALOPATHY: Status: ACTIVE | Noted: 2021-10-13

## 2022-03-19 PROBLEM — A41.9 SEPSIS (HCC): Status: ACTIVE | Noted: 2021-10-13

## 2022-03-19 PROBLEM — B99.9 INFECTIOUS ENCEPHALOPATHY: Status: ACTIVE | Noted: 2021-10-13

## 2022-03-20 PROBLEM — N39.0 UTI (URINARY TRACT INFECTION): Status: ACTIVE | Noted: 2021-10-13

## 2022-06-20 ENCOUNTER — APPOINTMENT (OUTPATIENT)
Dept: CT IMAGING | Age: 87
DRG: 478 | End: 2022-06-20
Attending: NURSE PRACTITIONER
Payer: MEDICARE

## 2022-06-20 ENCOUNTER — APPOINTMENT (OUTPATIENT)
Dept: CT IMAGING | Age: 87
DRG: 478 | End: 2022-06-20
Attending: EMERGENCY MEDICINE
Payer: MEDICARE

## 2022-06-20 ENCOUNTER — HOSPITAL ENCOUNTER (INPATIENT)
Age: 87
LOS: 7 days | Discharge: NURSING, REHAB OR LONG-TERM CARE | DRG: 478 | End: 2022-06-27
Attending: EMERGENCY MEDICINE | Admitting: FAMILY MEDICINE
Payer: MEDICARE

## 2022-06-20 DIAGNOSIS — S32.010A COMPRESSION FRACTURE OF L1 VERTEBRA, INITIAL ENCOUNTER (HCC): Primary | ICD-10-CM
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
## 2022-06-20 DIAGNOSIS — S32.020A COMPRESSION FRACTURE OF L2 VERTEBRA, INITIAL ENCOUNTER (HCC): ICD-10-CM

## 2022-06-20 PROBLEM — S32.000A LUMBAR COMPRESSION FRACTURE (HCC): Status: ACTIVE | Noted: 2022-06-20

## 2022-06-20 PROBLEM — M54.59 INTRACTABLE LOW BACK PAIN: Status: ACTIVE | Noted: 2022-06-20

## 2022-06-20 PROCEDURE — 65270000029 HC RM PRIVATE

## 2022-06-20 PROCEDURE — 99285 EMERGENCY DEPT VISIT HI MDM: CPT

## 2022-06-20 PROCEDURE — 72131 CT LUMBAR SPINE W/O DYE: CPT

## 2022-06-20 PROCEDURE — 72125 CT NECK SPINE W/O DYE: CPT

## 2022-06-20 PROCEDURE — 72192 CT PELVIS W/O DYE: CPT

## 2022-06-20 RX ORDER — MORPHINE SULFATE 2 MG/ML
2 INJECTION, SOLUTION INTRAMUSCULAR; INTRAVENOUS ONCE
Status: COMPLETED | OUTPATIENT
Start: 2022-06-20 | End: 2022-06-21

## 2022-06-20 NOTE — Clinical Note
Status[de-identified] INPATIENT [101]   Type of Bed: Medical [8]   Inpatient Hospitalization Certified Necessary for the Following Reasons: 1.  Patient Failed outpatient treatment (further clarification in H&P documentation)   Admitting Diagnosis: Lumbar compression fracture [facility]) [[id number]]   Admitting Diagnosis: Intractable low back pain [[id number]]   Admitting Physician: Albaro Lamas [[id number]]   Attending Physician: Albaro Lamas [[id number]]   Estimated Length of Stay: 2 Midnights   Discharge Plan[de-identified] Home with Office Follow-up

## 2022-06-21 PROBLEM — S22.000A COMPRESSION FRACTURE OF BODY OF THORACIC VERTEBRA (HCC): Status: ACTIVE | Noted: 2022-06-21

## 2022-06-21 LAB
ALBUMIN SERPL-MCNC: 2.9 G/DL (ref 3.5–5)
ALBUMIN/GLOB SERPL: 0.7 {RATIO} (ref 1.1–2.2)
ALP SERPL-CCNC: 137 U/L (ref 45–117)
ALT SERPL-CCNC: 106 U/L (ref 12–78)
ANION GAP SERPL CALC-SCNC: 5 MMOL/L (ref 5–15)
AST SERPL W P-5'-P-CCNC: 75 U/L (ref 15–37)
BASOPHILS # BLD: 0 K/UL (ref 0–0.1)
BASOPHILS NFR BLD: 0 % (ref 0–1)
BILIRUB SERPL-MCNC: 2.4 MG/DL (ref 0.2–1)
BUN SERPL-MCNC: 13 MG/DL (ref 6–20)
BUN/CREAT SERPL: 24 (ref 12–20)
CA-I BLD-MCNC: 8.3 MG/DL (ref 8.5–10.1)
CHLORIDE SERPL-SCNC: 109 MMOL/L (ref 97–108)
CO2 SERPL-SCNC: 25 MMOL/L (ref 21–32)
CREAT SERPL-MCNC: 0.55 MG/DL (ref 0.55–1.02)
DIFFERENTIAL METHOD BLD: ABNORMAL
EOSINOPHIL # BLD: 0.1 K/UL (ref 0–0.4)
EOSINOPHIL NFR BLD: 1 % (ref 0–7)
ERYTHROCYTE [DISTWIDTH] IN BLOOD BY AUTOMATED COUNT: 13.4 % (ref 11.5–14.5)
GLOBULIN SER CALC-MCNC: 4.1 G/DL (ref 2–4)
GLUCOSE SERPL-MCNC: 88 MG/DL (ref 65–100)
HCT VFR BLD AUTO: 34.1 % (ref 35–47)
HGB BLD-MCNC: 11.3 G/DL (ref 11.5–16)
IMM GRANULOCYTES # BLD AUTO: 0 K/UL (ref 0–0.04)
IMM GRANULOCYTES NFR BLD AUTO: 0 % (ref 0–0.5)
LYMPHOCYTES # BLD: 1.2 K/UL (ref 0.8–3.5)
LYMPHOCYTES NFR BLD: 13 % (ref 12–49)
MCH RBC QN AUTO: 32.1 PG (ref 26–34)
MCHC RBC AUTO-ENTMCNC: 33.1 G/DL (ref 30–36.5)
MCV RBC AUTO: 96.9 FL (ref 80–99)
MONOCYTES # BLD: 0.5 K/UL (ref 0–1)
MONOCYTES NFR BLD: 6 % (ref 5–13)
MRSA DNA SPEC QL NAA+PROBE: NOT DETECTED
NEUTS SEG # BLD: 7.1 K/UL (ref 1.8–8)
NEUTS SEG NFR BLD: 80 % (ref 32–75)
NRBC # BLD: 0 K/UL (ref 0–0.01)
NRBC BLD-RTO: 0 PER 100 WBC
PLATELET # BLD AUTO: 156 K/UL (ref 150–400)
PMV BLD AUTO: 9.9 FL (ref 8.9–12.9)
POTASSIUM SERPL-SCNC: 3.4 MMOL/L (ref 3.5–5.1)
PROT SERPL-MCNC: 7 G/DL (ref 6.4–8.2)
RBC # BLD AUTO: 3.52 M/UL (ref 3.8–5.2)
SODIUM SERPL-SCNC: 139 MMOL/L (ref 136–145)
WBC # BLD AUTO: 8.9 K/UL (ref 3.6–11)

## 2022-06-21 PROCEDURE — 85025 COMPLETE CBC W/AUTO DIFF WBC: CPT

## 2022-06-21 PROCEDURE — 80053 COMPREHEN METABOLIC PANEL: CPT

## 2022-06-21 PROCEDURE — 74011250636 HC RX REV CODE- 250/636: Performed by: NURSE PRACTITIONER

## 2022-06-21 PROCEDURE — 74011250637 HC RX REV CODE- 250/637: Performed by: PHYSICIAN ASSISTANT

## 2022-06-21 PROCEDURE — 74011250636 HC RX REV CODE- 250/636: Performed by: FAMILY MEDICINE

## 2022-06-21 PROCEDURE — 36415 COLL VENOUS BLD VENIPUNCTURE: CPT

## 2022-06-21 PROCEDURE — 87641 MR-STAPH DNA AMP PROBE: CPT

## 2022-06-21 PROCEDURE — 74011250636 HC RX REV CODE- 250/636: Performed by: PHYSICIAN ASSISTANT

## 2022-06-21 PROCEDURE — 65270000029 HC RM PRIVATE

## 2022-06-21 RX ORDER — CELECOXIB 200 MG/1
200 CAPSULE ORAL 2 TIMES DAILY
Status: DISCONTINUED | OUTPATIENT
Start: 2022-06-21 | End: 2022-06-21

## 2022-06-21 RX ORDER — HYDROMORPHONE HYDROCHLORIDE 1 MG/ML
0.5 INJECTION, SOLUTION INTRAMUSCULAR; INTRAVENOUS; SUBCUTANEOUS
Status: DISCONTINUED | OUTPATIENT
Start: 2022-06-21 | End: 2022-06-25

## 2022-06-21 RX ORDER — ENOXAPARIN SODIUM 100 MG/ML
40 INJECTION SUBCUTANEOUS DAILY
Status: DISCONTINUED | OUTPATIENT
Start: 2022-06-21 | End: 2022-06-24

## 2022-06-21 RX ORDER — OXYCODONE HYDROCHLORIDE 10 MG/1
10 TABLET ORAL
Status: DISCONTINUED | OUTPATIENT
Start: 2022-06-21 | End: 2022-06-27 | Stop reason: HOSPADM

## 2022-06-21 RX ORDER — ONDANSETRON 2 MG/ML
4 INJECTION INTRAMUSCULAR; INTRAVENOUS
Status: DISCONTINUED | OUTPATIENT
Start: 2022-06-21 | End: 2022-06-27 | Stop reason: HOSPADM

## 2022-06-21 RX ORDER — ACETAMINOPHEN 650 MG/1
650 SUPPOSITORY RECTAL
Status: DISCONTINUED | OUTPATIENT
Start: 2022-06-21 | End: 2022-06-27 | Stop reason: HOSPADM

## 2022-06-21 RX ORDER — ACETAMINOPHEN 325 MG/1
650 TABLET ORAL
Status: DISCONTINUED | OUTPATIENT
Start: 2022-06-21 | End: 2022-06-27 | Stop reason: HOSPADM

## 2022-06-21 RX ORDER — ONDANSETRON 4 MG/1
4 TABLET, ORALLY DISINTEGRATING ORAL
Status: DISCONTINUED | OUTPATIENT
Start: 2022-06-21 | End: 2022-06-27 | Stop reason: HOSPADM

## 2022-06-21 RX ORDER — KETOROLAC TROMETHAMINE 15 MG/ML
15 INJECTION, SOLUTION INTRAMUSCULAR; INTRAVENOUS EVERY 8 HOURS
Status: COMPLETED | OUTPATIENT
Start: 2022-06-21 | End: 2022-06-22

## 2022-06-21 RX ORDER — POTASSIUM CHLORIDE 750 MG/1
40 TABLET, FILM COATED, EXTENDED RELEASE ORAL
Status: ACTIVE | OUTPATIENT
Start: 2022-06-21 | End: 2022-06-21

## 2022-06-21 RX ORDER — POLYETHYLENE GLYCOL 3350 17 G/17G
17 POWDER, FOR SOLUTION ORAL DAILY PRN
Status: DISCONTINUED | OUTPATIENT
Start: 2022-06-21 | End: 2022-06-27 | Stop reason: HOSPADM

## 2022-06-21 RX ORDER — SODIUM CHLORIDE 9 MG/ML
75 INJECTION, SOLUTION INTRAVENOUS CONTINUOUS
Status: DISCONTINUED | OUTPATIENT
Start: 2022-06-21 | End: 2022-06-25

## 2022-06-21 RX ORDER — OXYCODONE HYDROCHLORIDE 5 MG/1
5 TABLET ORAL
Status: DISCONTINUED | OUTPATIENT
Start: 2022-06-21 | End: 2022-06-21

## 2022-06-21 RX ORDER — MORPHINE SULFATE 2 MG/ML
1 INJECTION, SOLUTION INTRAMUSCULAR; INTRAVENOUS
Status: DISCONTINUED | OUTPATIENT
Start: 2022-06-21 | End: 2022-06-21

## 2022-06-21 RX ORDER — CYCLOBENZAPRINE HCL 10 MG
5 TABLET ORAL
Status: DISCONTINUED | OUTPATIENT
Start: 2022-06-21 | End: 2022-06-27 | Stop reason: HOSPADM

## 2022-06-21 RX ORDER — OXYCODONE HYDROCHLORIDE 5 MG/1
5 TABLET ORAL
Status: DISCONTINUED | OUTPATIENT
Start: 2022-06-21 | End: 2022-06-27 | Stop reason: HOSPADM

## 2022-06-21 RX ORDER — ACETAMINOPHEN 500 MG
500 TABLET ORAL
COMMUNITY
End: 2022-06-27

## 2022-06-21 RX ORDER — MORPHINE SULFATE 2 MG/ML
2 INJECTION, SOLUTION INTRAMUSCULAR; INTRAVENOUS ONCE
Status: COMPLETED | OUTPATIENT
Start: 2022-06-21 | End: 2022-06-21

## 2022-06-21 RX ADMIN — SODIUM CHLORIDE 75 ML/HR: 9 INJECTION, SOLUTION INTRAVENOUS at 10:52

## 2022-06-21 RX ADMIN — KETOROLAC TROMETHAMINE 15 MG: 15 INJECTION, SOLUTION INTRAMUSCULAR; INTRAVENOUS at 18:34

## 2022-06-21 RX ADMIN — HYDROMORPHONE HYDROCHLORIDE 0.5 MG: 1 INJECTION, SOLUTION INTRAMUSCULAR; INTRAVENOUS; SUBCUTANEOUS at 22:49

## 2022-06-21 RX ADMIN — MORPHINE SULFATE 2 MG: 2 INJECTION, SOLUTION INTRAMUSCULAR; INTRAVENOUS at 01:26

## 2022-06-21 RX ADMIN — MORPHINE SULFATE 2 MG: 2 INJECTION, SOLUTION INTRAMUSCULAR; INTRAVENOUS at 05:11

## 2022-06-21 RX ADMIN — CYCLOBENZAPRINE 5 MG: 10 TABLET, FILM COATED ORAL at 18:34

## 2022-06-21 RX ADMIN — MORPHINE SULFATE 1 MG: 2 INJECTION, SOLUTION INTRAMUSCULAR; INTRAVENOUS at 15:26

## 2022-06-21 RX ADMIN — HYDROMORPHONE HYDROCHLORIDE 0.5 MG: 1 INJECTION, SOLUTION INTRAMUSCULAR; INTRAVENOUS; SUBCUTANEOUS at 17:09

## 2022-06-21 RX ADMIN — KETOROLAC TROMETHAMINE 15 MG: 15 INJECTION, SOLUTION INTRAMUSCULAR; INTRAVENOUS at 21:43

## 2022-06-21 NOTE — PROGRESS NOTES
Problem: Falls - Risk of  Goal: *Absence of Falls  Description: Document Ellis Cease Fall Risk and appropriate interventions in the flowsheet.   Outcome: Progressing Towards Goal  Note: Fall Risk Interventions:                                Problem: Patient Education: Go to Patient Education Activity  Goal: Patient/Family Education  Outcome: Progressing Towards Goal     Problem: Pain  Goal: *Control of Pain  Outcome: Progressing Towards Goal  Goal: *PALLIATIVE CARE:  Alleviation of Pain  Outcome: Progressing Towards Goal     Problem: Patient Education: Go to Patient Education Activity  Goal: Patient/Family Education  Outcome: Progressing Towards Goal     Problem: Patient Education: Go to Patient Education Activity  Goal: Patient/Family Education  Outcome: Progressing Towards Goal     Problem: Pain  Goal: *Control of Pain  Outcome: Progressing Towards Goal     Problem: Pain  Goal: *PALLIATIVE CARE:  Alleviation of Pain  Outcome: Progressing Towards Goal

## 2022-06-21 NOTE — ROUTINE PROCESS
TRANSFER - OUT REPORT:    Verbal report given to 2e(name) brian Brooks  being transferred to 224(unit) for routine progression of care       Report consisted of patients Situation, Background, Assessment and   Recommendations(SBAR). Information from the following report(s) SBAR, Kardex, ED Summary, Intake/Output, MAR and Recent Results was reviewed with the receiving nurse. Lines:   Peripheral IV 06/20/22 Posterior;Right Hand (Active)        Opportunity for questions and clarification was provided.       Patient transported with:   Monitor  Tech

## 2022-06-21 NOTE — CONSULTS
ORTHOPEDIC CONSULT    Patient: Lianet Neri MRN: [id number]  SSN: [id number]    YOB: 1935  Age: 81 y.o. Sex: female      Subjective:      Lianet Neri is a 81 y.o. female who is being seen in orthopedic consultation in the emergency room for an L1 and L2 compression fracture. The patient states she was getting out of bed to go to the bathroom when her right leg gave out on her and she fell causing injury to her back. The patient states since that time she has had severe pain of her lower back radiating to the buttock region. She denies any numbness or tending of the lower extremities. She denies any injury to head or cervical spine from a fall. She denies any bowel or bladder incontinence. The patient states she does have occasional low back pain prior to the fall. She also complains of occasional pain of her bilateral hips left being worse than right. She denies any dizziness or lightheadedness prior to falling. She denies any other musculoskeletal complaints at this time. Past Medical History:   Diagnosis Date    A-fib [facility])      No past surgical history on file. No family history on file. Social History     Tobacco Use    Smoking status: Not on file    Smokeless tobacco: Not on file   Substance Use Topics    Alcohol use: Not on file      Prior to Admission medications    Medication Sig Start Date End Date Taking? Authorizing Provider   acetaminophen (TYLENOL) 500 mg tablet Take 500 mg by mouth every six (6) hours as needed for Pain. Yes Provider, Historical       No Known Allergies    Review of Systems:  Review of Systems   Constitutional: Negative. HENT: Negative. Eyes: Negative. Respiratory: Negative. Cardiovascular: Negative. Gastrointestinal: Negative. Genitourinary: Negative. Musculoskeletal: Positive for back pain. Skin: Negative. Neurological: Negative. Endo/Heme/Allergies: Negative. Psychiatric/Behavioral: Negative. Objective:     Current Facility-Administered Medications   Medication Dose Route Frequency    potassium chloride SR (KLOR-CON 10) tablet 40 mEq  40 mEq Oral NOW    0.9% sodium chloride infusion  75 mL/hr IntraVENous CONTINUOUS    acetaminophen (TYLENOL) tablet 650 mg  650 mg Oral Q6H PRN    Or    acetaminophen (TYLENOL) suppository 650 mg  650 mg Rectal Q6H PRN    polyethylene glycol (MIRALAX) packet 17 g  17 g Oral DAILY PRN    ondansetron (ZOFRAN ODT) tablet 4 mg  4 mg Oral Q8H PRN    Or    ondansetron (ZOFRAN) injection 4 mg  4 mg IntraVENous Q6H PRN    enoxaparin (LOVENOX) injection 40 mg  40 mg SubCUTAneous DAILY    oxyCODONE IR (ROXICODONE) tablet 5 mg  5 mg Oral Q4H PRN    morphine injection 1 mg  1 mg IntraVENous Q6H PRN    celecoxib (CELEBREX) capsule 200 mg  200 mg Oral BID     Current Outpatient Medications   Medication Sig    acetaminophen (TYLENOL) 500 mg tablet Take 500 mg by mouth every six (6) hours as needed for Pain. Vitals:    06/21/22 0129 06/21/22 0512 06/21/22 0606 06/21/22 0811   BP: 134/75 (!) 140/87 119/69 123/77   Pulse: 95 88 87 96   Resp: 18 22 16 18   Temp:       SpO2: 98%  96% 95%   Weight:       Height:            Alert and oriented x3, No apparent distress    Physical Exam:  Back Limited exam.  There was tenderness palpation along the L2-L3 vertebral body as well as the sacral region. No sciatic notch tenderness. There is mild tenderness palpation to the left-sided paraspinal region around L2-L3. Lower extremities: Limited range of motion of the lower extremity secondary to pain. Strength 4 out of 5 on the left 3 out of 5 on the right. Skin warm dry and intact throughout. DTRs were absent bilaterally throughout. No clonus. No calf pain to palpation. Full range of motion of her bilateral knees with pain elicited in her lower back. DP/PT pulses are palpable. Cap refill is 2 seconds. EHL/DF/PF is 5 out of 5.     Labs:  CBC:  Recent Labs 06/21/22  0130   WBC 8.9   RBC 3.52*   HGB 11.3*   HCT 34.1*   MCV 96.9   RDW 13.4        CHEMISTRIES:  Recent Labs     06/21/22 0130      K 3.4*   *   CO2 25   BUN 13   CREA 0.55   CA 8.3*   PT/INR:No results for input(s): INR, INREXT in the last 72 hours. No lab exists for component: PROTIME  APTT:No results for input(s): APTT in the last 72 hours. LIVER PROFILE:  Recent Labs     06/21/22 0130   AST 75*   *       IMAGING:  CT scan of her lumbar spine without contrast shows L2 compression fracture with approximately 75% loss of height. There is a mild L1 compression fracture with less than 2% loss of height. There is multilevel degenerative disc disease seen throughout lumbar spine. There is two-level canal and foraminal stenosis mainly at L3 and L4.    CT scan taken of her pelvis show osteonecrosis of the femoral heads left being worse than right. No acute fracture seen. CT scan taken of her cervical spine shows possible nonacute C7 compression. There is mild anterolisthesis seen at C4-C5. Multilevel degenerative disc disease throughout cervical spine. Assessment/Plan:     Hospital Problems  Date Reviewed: 10/13/2021          Codes Class Noted POA    Compression fracture of body of thoracic vertebra ([address] ICD-10-CM: S22.000A  ICD-9-CM: 805.2  6/21/2022 Unknown        Lumbar compression fracture ([facility]) ICD-10-CM: S32.000A  ICD-9-CM: 805.4  6/20/2022 Unknown        Intractable low back pain ICD-10-CM: M54.59  ICD-9-CM: 724.2  6/20/2022 Unknown          L2 and L1 compression fracture    Discussed with the patient that she may be a candidate for kyphoplasty of her compression fractures. I explained to her we will proceed with getting MRI of the lumbar spine to further evaluate the acuity as well as significance of foraminal and canal stenosis. I have discussed this patient with interventional radiology. We will consult for possible kyphoplasty.     I have included pain management. Bedrest for now until MRI is completed. This patient was examined direct consult with Dr. Simon Liu. Thank you for the courtesy of this consult.     Signed By: Carolyn Guidry PA-C     June 21, 2022

## 2022-06-21 NOTE — PROGRESS NOTES
Reason for Admission:  Lumbar Fx                     RUR Score:   9%                  Plan for utilizing home health:    None @ this time. Uses cane. Son to  f/u with [employer] for PCA assist in the home upon discharge - to f/u with CM. PCP: First and Last name:  Pura Lange     Name of Practice:    Are you a current patient: Yes/No: Yes   Approximate date of last visit: Unknown   Can you participate in a virtual visit with your PCP: Yes/Call                    Current Advanced Directive/Advance Care Plan: Full Code      Healthcare Decision Maker:              Primary Decision Maker: Samir Chris - Son - [phone]                  Transition of Care Plan:                    CM attempted to speak with pt - asked to call son d/t pain. Pt lives alone. Son/family member to transport home. Per son pt does well @ home. Call Rxs into [employer] on [employer] upon discharge.

## 2022-06-21 NOTE — CONSULTS
Consult Date: 6/21/2022    IP CONSULT TO INTERVENTIONAL RADIOLOGY  Consult performed by: Brent Dangelo PA-C  Consult ordered by: Rukhsana Santos PA-C  Reason for consult: Severe lower back pain after GLF  Assessment/Recommendations: ASSESSMENT:  GLF with significant lower back pain. CT LS spine showed compression fracture at                                L1 and L2. Tender to palpation in this area. Likely traumatic acute compression                               Fracture. IR consulted for possible kyphoplasty. PLAN:  Would recommend MRI LS spine to further assess acute lower back injury. Patient would be               A good kyphoplasty candidate due to her acute pain and likely acute compression fracture. Hold anticoagulation and patient should be NPO after midnight for possible kyphoplasty                 Tomorrow. Please contact IR with any questions. Subjective Patient is an 82 yo female who was brought to the ER after falling while using a bedside commode causing severe lower back pain. Pain does not radiate to her lower extremities. No lower extremity numbness or tingling. No loss of bowel or bladder function. Pain is severe with minimal movement. She has had intermittent back pain in the past.  Also bilateral hip pain. No injury to her head or neck. No other complaints today. History of A fib not on anticoagulation. CT LS spine showed possible acute compression fracture at L1 and L2. IR consulted for possible kyphoplasty. Past Medical History:   Diagnosis Date    A-fib [facility])       No past surgical history on file. No family history on file.    Social History     Tobacco Use    Smoking status: Not on file    Smokeless tobacco: Not on file   Substance Use Topics    Alcohol use: Not on file       Current Facility-Administered Medications   Medication Dose Route Frequency Provider Last Rate Last Admin    potassium chloride SR (KLOR-CON 10) tablet 40 mEq  40 mEq Oral NOW Bella Rae MD        0.9% sodium chloride infusion  75 mL/hr IntraVENous CONTINUOUS Marlo Rae MD 75 mL/hr at 06/21/22 1052 75 mL/hr at 06/21/22 1052    acetaminophen (TYLENOL) tablet 650 mg  650 mg Oral Q6H PRN Bella Rae MD        Or   Michelle Sy acetaminophen (TYLENOL) suppository 650 mg  650 mg Rectal Q6H PRN Bella Rae MD        polyethylene glycol (MIRALAX) packet 17 g  17 g Oral DAILY PRN Bella Rae MD        ondansetron (ZOFRAN ODT) tablet 4 mg  4 mg Oral Q8H PRN Bella Rae MD        Or    [facility] injection 4 mg  4 mg IntraVENous Q6H PRN Bella Rae MD        enoxaparin (LOVENOX) injection 40 mg  40 mg SubCUTAneous DAILY Bella Rae MD        morphine injection 1 mg  1 mg IntraVENous Q6H PRN Marlo Rae MD   1 mg at 06/21/22 1526    celecoxib (CELEBREX) capsule 200 mg  200 mg Oral BID Wyatt Levy PA-C        oxyCODONE IR (ROXICODONE) tablet 5 mg  5 mg Oral Q4H PRN Wyatt Levy PA-C        oxyCODONE IR (ROXICODONE) tablet 10 mg  10 mg Oral Q4H PRN Wyatt Levy PA-C         Current Outpatient Medications   Medication Sig Dispense Refill    acetaminophen (TYLENOL) 500 mg tablet Take 500 mg by mouth every six (6) hours as needed for Pain. Review of Systems   Constitutional: Negative for appetite change and unexpected weight change. Eyes: Negative for visual disturbance. Respiratory: Negative for shortness of breath. Cardiovascular: Negative for chest pain. Gastrointestinal: Negative for abdominal pain. Genitourinary: Negative for dysuria. Musculoskeletal: Positive for back pain. Negative for neck pain. Skin: Negative for color change and rash. Neurological: Negative for dizziness, speech difficulty and light-headedness. Psychiatric/Behavioral: Negative for confusion.        Objective     Vital signs for last 24 hours:  Visit Vitals  /77   Pulse 96   Temp 97.8 °F (36.6 °C)   Resp 18   Ht 5' 4\" (1.626 m)   Wt 72.6 kg (160 lb)   SpO2 95%   BMI 27.46 kg/m²       Intake/Output this shift:  Current Shift: No intake/output data recorded. Last 3 Shifts: No intake/output data recorded. Data Review:   Recent Results (from the past 24 hour(s))   CBC WITH AUTOMATED DIFF    Collection Time: 06/21/22  1:30 AM   Result Value Ref Range    WBC 8.9 3.6 - 11.0 K/uL    RBC 3.52 (L) 3.80 - 5.20 M/uL    HGB 11.3 (L) 11.5 - 16.0 g/dL    HCT 34.1 (L) 35.0 - 47.0 %    MCV 96.9 80.0 - 99.0 FL    MCH 32.1 26.0 - 34.0 PG    MCHC 33.1 30.0 - 36.5 g/dL    RDW 13.4 11.5 - 14.5 %    PLATELET 413 668 - 585 K/uL    MPV 9.9 8.9 - 12.9 FL    NRBC 0.0 0.0  WBC    ABSOLUTE NRBC 0.00 0.00 - 0.01 K/uL    NEUTROPHILS 80 (H) 32 - 75 %    LYMPHOCYTES 13 12 - 49 %    MONOCYTES 6 5 - 13 %    EOSINOPHILS 1 0 - 7 %    BASOPHILS 0 0 - 1 %    IMMATURE GRANULOCYTES 0 0 - 0.5 %    ABS. NEUTROPHILS 7.1 1.8 - 8.0 K/UL    ABS. LYMPHOCYTES 1.2 0.8 - 3.5 K/UL    ABS. MONOCYTES 0.5 0.0 - 1.0 K/UL    ABS. EOSINOPHILS 0.1 0.0 - 0.4 K/UL    ABS. BASOPHILS 0.0 0.0 - 0.1 K/UL    ABS. IMM. GRANS. 0.0 0.00 - 0.04 K/UL    DF AUTOMATED     METABOLIC PANEL, COMPREHENSIVE    Collection Time: 06/21/22  1:30 AM   Result Value Ref Range    Sodium 139 136 - 145 mmol/L    Potassium 3.4 (L) 3.5 - 5.1 mmol/L    Chloride 109 (H) 97 - 108 mmol/L    CO2 25 21 - 32 mmol/L    Anion gap 5 5 - 15 mmol/L    Glucose 88 65 - 100 mg/dL    BUN 13 6 - 20 mg/dL    Creatinine 0.55 0.55 - 1.02 mg/dL    BUN/Creatinine ratio 24 (H) 12 - 20      GFR est AA >60 >60 ml/min/1.73m2    GFR est non-AA >60 >60 ml/min/1.73m2    Calcium 8.3 (L) 8.5 - 10.1 mg/dL    Bilirubin, total 2.4 (H) 0.2 - 1.0 mg/dL    AST (SGOT) 75 (H) 15 - 37 U/L    ALT (SGPT) 106 (H) 12 - 78 U/L    Alk.  phosphatase 137 (H) 45 - 117 U/L    Protein, total 7.0 6.4 - 8.2 g/dL    Albumin 2.9 (L) 3.5 - 5.0 g/dL    Globulin 4.1 (H) 2.0 - 4.0 g/dL    A-G Ratio 0.7 (L) 1.1 - 2.2         Physical Exam  Constitutional:       Appearance: Normal appearance. HENT:      Head: Normocephalic and atraumatic. Mouth/Throat:      Mouth: Mucous membranes are moist.   Eyes:      Conjunctiva/sclera: Conjunctivae normal.   Cardiovascular:      Rate and Rhythm: Normal rate and regular rhythm. Heart sounds: Normal heart sounds. Pulmonary:      Effort: Pulmonary effort is normal.      Breath sounds: Normal breath sounds. Abdominal:      Palpations: Abdomen is soft. Musculoskeletal:         General: Tenderness present. Cervical back: Normal range of motion. Comments: Significant midline lumbar tenderness around the L1-L2 area. Mild tenderness to proximal midline sacrum. No swelling, ecchymosis or erythema to lower back. Skin:     General: Skin is warm and dry. Neurological:      Mental Status: She is alert and oriented to person, place, and time. Cranial Nerves: No cranial nerve deficit. Sensory: No sensory deficit. Motor: No weakness. Psychiatric:         Mood and Affect: Mood normal.         Behavior: Behavior normal.         Thought Content:  Thought content normal.         Judgment: Judgment normal.

## 2022-06-21 NOTE — ACP (ADVANCE CARE PLANNING)
Advance Care Planning   Healthcare Decision Maker:       Primary Decision Maker: Cindy Rasheed - Son - [phone]    Primary Decision Maker: Suzi Casanova - Spouse - [phone]

## 2022-06-21 NOTE — CONSULTS
Spine Surgery Consult Note    Patient: Carilyn Kussmaul MRN: [id number]  SSN: [id number]    YOB: 1935  Age: 81 y.o. Sex: female        Assessment:     Hospital Problems  Date Reviewed: 10/13/2021          Codes Class Noted POA    Compression fracture of body of thoracic vertebra ([address] ICD-10-CM: S22.000A  ICD-9-CM: 805.2  6/21/2022 Unknown        Lumbar compression fracture (HCC) ICD-10-CM: S32.000A  ICD-9-CM: 805.4  6/20/2022 Unknown        Intractable low back pain ICD-10-CM: M54.59  ICD-9-CM: 724.2  6/20/2022 Unknown              Plan:     MRI of the lumbar spine was recommended to further characterize the L1 and L2 compression fractures. The L2 compression fracture has components that appear to be chronic as well as components which are acute. The L1 fracture appears to be acute. She may be a candidate for kyphoplasty at 1 or both levels based on MRI findings. She also has some leg weakness and intermittent giving way symptoms which are likely due to lumbar stenosis. This can be delineated further on the MRI and can guide further treatment for this issue as well. I will defer to interventional radiology for the kyphoplasty. I can follow-up with the patient in the outpatient setting once she is discharged from the hospital.  Thank you for allowing me to participate in the care of this patient. Subjective:      Carilyn Kussmaul is a 81 y.o. female who is being seen for L1 and L2 compression fractures. She reports a fall when her legs gave out last Sunday causing her to land on her buttock region. Since then she has been having pain radiating from the buttocks to the mid back. She has no new numbness into the legs but has baseline numbness for many years along with giving way symptoms when walking. She uses a cane for ambulation. She denies bowel or bladder dysfunction. Past Medical History:   Diagnosis Date    A-fib [facility])      No past surgical history on file.    No family history on file.   Social History     Tobacco Use    Smoking status: Not on file    Smokeless tobacco: Not on file   Substance Use Topics    Alcohol use: Not on file      Current Facility-Administered Medications   Medication Dose Route Frequency Provider Last Rate Last Admin    potassium chloride SR (KLOR-CON 10) tablet 40 mEq  40 mEq Oral NOW Rafael Rae MD        0.9% sodium chloride infusion  75 mL/hr IntraVENous CONTINUOUS Marlo Rae MD 75 mL/hr at 06/21/22 1052 75 mL/hr at 06/21/22 1052    acetaminophen (TYLENOL) tablet 650 mg  650 mg Oral Q6H PRN Rafael Rae MD        Or   Amy Yates acetaminophen (TYLENOL) suppository 650 mg  650 mg Rectal Q6H PRN Rafael Rae MD        polyethylene glycol (MIRALAX) packet 17 g  17 g Oral DAILY PRN Rafael Rae MD        ondansetron (ZOFRAN ODT) tablet 4 mg  4 mg Oral Q8H PRN Rafael Rae MD        Or    ondansetron (ZOFRAN) injection 4 mg  4 mg IntraVENous Q6H PRN Rafael Rae MD        enoxaparin (LOVENOX) injection 40 mg  40 mg SubCUTAneous DAILY Rafael Rae MD        oxyCODONE IR (ROXICODONE) tablet 5 mg  5 mg Oral Q4H PRN Wyatt Levy PA-C        oxyCODONE IR (ROXICODONE) tablet 10 mg  10 mg Oral Q4H PRN Wyatt Levy PA-C        HYDROmorphone (DILAUDID) syringe 0.5 mg  0.5 mg IntraVENous Q6H PRN Wyatt Levy PA-C   0.5 mg at 06/21/22 1709    ketorolac (TORADOL) injection 15 mg  15 mg IntraVENous Q8H Wyatt Levy PA-C            No Known Allergies    Review of Systems:  Constitutional: negative  Eyes: negative  Ears, Nose, Mouth, Throat, and Face: negative  Respiratory: negative  Cardiovascular: negative  Gastrointestinal: negative  Genitourinary:negative  Hematologic/Lymphatic: negative  Musculoskeletal:positive for stiff joints, back pain and muscle weakness  Neurological: positive for gait problems and weakness  Behavioral/Psychiatric: negative  Endocrine: negative  Allergic/Immunologic: negative    Objective:     Vitals:    06/21/22 0512 06/21/22 0606 06/21/22 0811 06/21/22 1446   BP: (!) 140/87 119/69 123/77 (!) 148/73   Pulse: 88 87 96 (!) 101   Resp: 22 16 18 18   Temp:    98.2 °F (36.8 °C)   SpO2:  96% 95% 97%   Weight:       Height:            Physical Exam:  GENERAL: alert, cooperative, severe distress  EXTREMITIES:  extremities normal, atraumatic, no cyanosis or edema  NEUROLOGIC: positive findings: Diminished bilateral patellar and Achilles reflexes, mild diffuse decreased sensation lower legs without clear demarcation, no gross motor deficits of both lower extremities in the supine position. Significant tenderness to palpation throughout the lumbar region which was very poorly tolerated by her. Imaging Review:    CT scan of the lumbar spine was reviewed and shows L2 severe compression fracture with some sclerosis as well as new fracture lines to suggest acute on chronic fracture. Subtle lucency through the L1 vertebral body and or indicated possible acute fracture although there is not significant compression deformity at this level.     Signed By: Artemio Ball MD     June 21, 2022

## 2022-06-21 NOTE — ED NOTES
Spoke to Dr. Sheila Velasquez about pain medication for the patient. Was told to contact ortho. Message left for Dr. Dago Marr through [address] for a call back for pain medications. [phone]: Spoke with Yessica Campebll who will come to see the patient and order pain medications.

## 2022-06-21 NOTE — ED PROVIDER NOTES
EMERGENCY DEPARTMENT HISTORY AND PHYSICAL EXAM      Date: 6/20/2022  Patient Name: Ruby Fleming    History of Presenting Illness     Chief Complaint   Patient presents with   [address] [facility] Fall    Back Pain       History Provided By: Patient    HPI: Ruby Fleming, 81 y.o. female with a past medical history significant afib presents to the ED with cc of back pain. Patient reports ground-level fall last night while walking to the bathroom. She states her legs gave out and she fell landing on her buttocks. She denies hitting her head or any LOC. She denies any blood thinner use. She is complaining of lower back pain and states she has been unable to walk since falling. She denies any  fever, chills, dysuria, hematuria, saddle anesthesia, bowel/bladder incontinence or numbness in her legs. She states her legs frequently gives out and she has a previous history of \"back problems \" but is unable to provide any details regarding. She is complaining of \"small pain \"; she states she was given pain medications by EMS prior to arrival which has helped significantly. There are no other complaints, changes, or physical findings at this time. PCP: None    No current facility-administered medications on file prior to encounter. Current Outpatient Medications on File Prior to Encounter   Medication Sig Dispense Refill    acetaminophen (TYLENOL) 500 mg tablet Take 500 mg by mouth every six (6) hours as needed for Pain. Past History     Past Medical History:  Past Medical History:   Diagnosis Date    A-fib [facility])        Past Surgical History:  No past surgical history on file. Family History:  No family history on file.     Social History:  Social History     Tobacco Use    Smoking status: Not on file    Smokeless tobacco: Not on file   Substance Use Topics    Alcohol use: Not on file    Drug use: Not on file       Allergies:  No Known Allergies      Review of Systems     Review of Systems   Constitutional: Negative. Negative for chills and fever. Respiratory: Negative. Cardiovascular: Negative. Genitourinary: Negative. Negative for dysuria and hematuria. Musculoskeletal: Positive for back pain and gait problem. Neurological: Negative for weakness and numbness. All other systems reviewed and are negative. Physical Exam     Physical Exam  Vitals and nursing note reviewed. Constitutional:       General: She is not in acute distress. Appearance: Normal appearance. Comments: Uncomfortable appearing   HENT:      Head: Normocephalic and atraumatic. Eyes:      Extraocular Movements: Extraocular movements intact. Conjunctiva/sclera: Conjunctivae normal.   Cardiovascular:      Rate and Rhythm: Normal rate and regular rhythm. Pulses:           Dorsalis pedis pulses are 2+ on the right side and 2+ on the left side. Heart sounds: Normal heart sounds. Pulmonary:      Effort: Pulmonary effort is normal.      Breath sounds: Normal breath sounds. No wheezing or rales. Abdominal:      General: Bowel sounds are normal.      Palpations: Abdomen is soft. Tenderness: There is no abdominal tenderness. Musculoskeletal:      Cervical back: Normal, normal range of motion and neck supple. Thoracic back: Normal.      Lumbar back: Tenderness and bony tenderness present. Decreased range of motion. Right lower leg: No edema. Left lower leg: No edema. Comments: Lumbar Back: + midline tenderness, no step off. +bilateral paraspinal tenderness. Sensation intact distally. Strength 5/5 in BL lower extremities   Skin:     General: Skin is warm and dry. Neurological:      General: No focal deficit present. Mental Status: She is alert. Psychiatric:         Mood and Affect: Mood normal.         Behavior: Behavior normal. Behavior is cooperative.          Lab and Diagnostic Study Results     Labs -   No results found for this or any previous visit (from the past 12 hour(s)). Radiologic Studies -   CT Results (most recent):  Study Result    Narrative & Impression   Examination: CT lumbar spine without contrast.     HISTORY: Fall     Technique: Transaxial computed tomographic images of the lumbar spine were  obtained without intravenous contrast. Coronal and sagittal reconstructions were  created. Dose Reduction: All CT scans at this facility are performed using dose reduction  optimization techniques as appropriate to a performed exam including the  following: Automated exposure control, adjustments of the mA and/or kV according  to patient size, or use of iterative reconstruction technique.     COMPARISON: None available.     FINDINGS: There is minimal anterolisthesis of L3 on L4 and L4 on L5. There is  severe compression fracture of L2 with approximately 75% loss of height. There  is mild posterior bulging of the dorsal cortex. There is also a mild superior  endplate compression fracture of L1 with less than 10% loss of height. There is  mild multilevel degenerative disc disease. There is multilevel facet  osteoarthritis. Degenerative changes result in spinal canal stenosis at multiple  levels, worst at L3-L4. There is multilevel neural foraminal narrowing. The  abdominal aorta and iliac arteries are atherosclerotic. There are calcifications  in the left adnexa. There is a 12 mm low-attenuation, likely cystic lesion in  the left adnexa. There is a 3 mm nonobstructing stone in the upper pole of the  right kidney. There is no hydronephrosis. There is a duodenal diverticulum. There is bilateral femoral head osteonecrosis.     IMPRESSION  1. Severe L2 compression fracture with approximate 75% loss of height. 2. Mild L1 compression fracture with less than 10% loss of height. 3. Multilevel degenerative changes in the lumbar spine with spinal canal  stenosis and neural foraminal narrowing. 4. Please see above report for further details.        Results from [facility] encounter on 06/20/22    CT PELV WO CONT    Narrative  Examination: CT pelvis without contrast.    HISTORY: Fall. Technique: Transaxial computed tomographic images of the pelvis were obtained  without intravenous contrast. Coronal and sagittal reconstructions were created. Dose Reduction: All CT scans at this facility are performed using dose reduction  optimization techniques as appropriate to a performed exam including the  following: Automated exposure control, adjustments of the mA and/or kV according  to patient size, or use of iterative reconstruction technique. COMPARISON: None available. FINDINGS: There is no dislocation. There is osteonecrosis of both femoral heads. There is mild left articular surface collapse. There may be minimal right  articular surface collapse. There is no evidence of femoral neck fracture. There  are degenerative changes of the pubic symphysis. There is bilateral sacroiliac  joint osteoarthritis. There is a small joint body in the left iliac bone. There  is lower lumbar degenerative disc disease and facet osteoarthritis. The visualized distal abdominal aorta and iliac arteries are atherosclerotic. There is colonic diverticulosis. No CT evidence of acute diverticulitis. There  are calcifications in the left adnexa. There is a 13 mm left adnexal cystic  lesion. There are dystrophic calcifications in the posterior subcutaneous  tissues. Impression  1. 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 2. There is a 13 mm left adnexal cystic lesion. This could be further evaluated  with pelvic sonogram on a nonemergent basis. Medical Decision Making   - I am the first provider for this patient. - I reviewed the vital signs, available nursing notes, past medical history, past surgical history, family history and social history. - Initial assessment performed.  The patients presenting problems have been discussed, and they are in agreement with the care plan formulated and outlined with them. I have encouraged them to ask questions as they arise throughout their visit. Vital Signs-Reviewed the patient's vital signs. Patient Vitals for the past 12 hrs:   Temp Pulse Resp BP SpO2   06/23/22 0153 98 °F (36.7 °C) 90 16 119/62 99 %   06/22/22 2044 98.3 °F (36.8 °C) 66 16 (!) 113/54 99 %       Records Reviewed: Nursing Notes and Old Medical Records    ED Course:     ED Course as of 06/23/22 0533   Mon Jun 20, 2022   215 81year-old female with low back pain after GLF last night, unable to walk. Vital signs stable, no focal deficits, neurovascularly intact. DDX: Lumbar strain, fracture, herniated disc. Plan: We will obtain imaging, treat symptomatically and reassess for need of additional work up     [LP]   2145 Patient and friend updated on plan of care, awaiting CT results. Patient states pain is manageable at this time.  [LP]   65 Radiology called for CT read clarification [LP]   2252 Per radiology, compression fractures appear to be acute versus chronic. Will admit as pt lives alone and unable to walk due to pain [LP]   2314 Orthopedics paged [LP]   2324 Spoke with orthopedics, Dr. Manuel Basurto, he recommends medical admission with spine orthopedics consult in the a.m. will contact Dr Angeles Fry [LP]      ED Course User Index  [LP] Forest Bennett NP           Disposition   Disposition: Admitted to [address] [place] [address],[address] Floor the case was discussed with the admitting physician Ha[place]    Admitted      Diagnosis     Clinical Impression:   1. Compression fracture of L1 vertebra, initial encounter ([facility]    2. Compression fracture of L2 vertebra, initial encounter ([facility]    3. Fall, initial encounter        Attestations:    Carolyn Ruiz NP    Please note that this dictation was completed with [employer], the [employer] voice recognition software.   Quite often unanticipated grammatical, syntax, homophones, and other interpretive errors are inadvertently transcribed by the computer software. Please disregard these errors. Please excuse any errors that have escaped final proofreading. Thank you.

## 2022-06-21 NOTE — H&P
History and Physical    NAME: Cristina Vásquez   :  1935   MRN:  [id number]     Date/Time:  2022 9:38 AM    Patient PCP: None  ______________________________________________________________________             Subjective:     CHIEF COMPLAINT:       Back pain      HISTORY OF PRESENT ILLNESS:       Patient is a 81y.o. year old female history of A. fib not on any medications not on any anticoagulation came to ER after have a fall while using bedside commode  Complaining of severe back pain    Came to emergency room seen by ER physician    CT     IMPRESSION  1. 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 2. There is a 13 mm left adnexal cystic lesion. This could be further evaluated  with pelvic sonogram on a nonemergent basis. 1. Severe L2 compression fracture with approximate 75% loss of height. 2. Mild L1 compression fracture with less than 10% loss of height. 3. Multilevel degenerative changes in the lumbar spine with spinal canal  stenosis and neural foraminal narrowing. 4. Please see above report for further details      Orthopedic was consulted regarding the severe l L2 compression fracture    Recommend the patient to be admitted to medical service    Patient denies any chest pain shortness of breath nausea vomiting diarrhea no constipation no fever no chills    Past Medical History:   Diagnosis Date    [facility] ([facility]         No past surgical history on file. Social History     Tobacco Use    Smoking status: Not on file    Smokeless tobacco: Not on file   Substance Use Topics    Alcohol use: Not on file        No family history on file.     No Known Allergies     Prior to Admission medications    Not on File         Current Facility-Administered Medications:     potassium chloride SR (KLOR-CON 10) tablet 40 mEq, 40 mEq, Oral, NOW, Marlo Rae MD    0.9% sodium chloride infusion, 75 mL/hr, IntraVENous, CONTINUOUS, Enedina Rae, MD    acetaminophen (TYLENOL) tablet 650 mg, 650 mg, Oral, Q6H PRN **OR** acetaminophen (TYLENOL) suppository 650 mg, 650 mg, Rectal, Q6H PRN, Enedina Rae MD    polyethylene glycol (MIRALAX) packet 17 g, 17 g, Oral, DAILY PRN, Enedina Rae MD    ondansetron (ZOFRAN ODT) tablet 4 mg, 4 mg, Oral, Q8H PRN **OR** ondansetron (ZOFRAN) injection 4 mg, 4 mg, IntraVENous, Q6H PRN, Marlo Rae MD    enoxaparin (LOVENOX) injection 40 mg, 40 mg, SubCUTAneous, DAILY, Enedina Rae MD  No current outpatient medications on file. LAB DATA REVIEWED:    Recent Results (from the past 24 hour(s))   CBC WITH AUTOMATED DIFF    Collection Time: 06/21/22  1:30 AM   Result Value Ref Range    WBC 8.9 3.6 - 11.0 K/uL    RBC 3.52 (L) 3.80 - 5.20 M/uL    HGB 11.3 (L) 11.5 - 16.0 g/dL    HCT 34.1 (L) 35.0 - 47.0 %    MCV 96.9 80.0 - 99.0 FL    MCH 32.1 26.0 - 34.0 PG    MCHC 33.1 30.0 - 36.5 g/dL    RDW 13.4 11.5 - 14.5 %    PLATELET 072 192 - 558 K/uL    MPV 9.9 8.9 - 12.9 FL    NRBC 0.0 0.0  WBC    ABSOLUTE NRBC 0.00 0.00 - 0.01 K/uL    NEUTROPHILS 80 (H) 32 - 75 %    LYMPHOCYTES 13 12 - 49 %    MONOCYTES 6 5 - 13 %    EOSINOPHILS 1 0 - 7 %    BASOPHILS 0 0 - 1 %    IMMATURE GRANULOCYTES 0 0 - 0.5 %    ABS. NEUTROPHILS 7.1 1.8 - 8.0 K/UL    ABS. LYMPHOCYTES 1.2 0.8 - 3.5 K/UL    ABS. MONOCYTES 0.5 0.0 - 1.0 K/UL    ABS. EOSINOPHILS 0.1 0.0 - 0.4 K/UL    ABS. BASOPHILS 0.0 0.0 - 0.1 K/UL    ABS. IMM.  GRANS. 0.0 0.00 - 0.04 K/UL    DF AUTOMATED     METABOLIC PANEL, COMPREHENSIVE    Collection Time: 06/21/22  1:30 AM   Result Value Ref Range    Sodium 139 136 - 145 mmol/L    Potassium 3.4 (L) 3.5 - 5.1 mmol/L    Chloride 109 (H) 97 - 108 mmol/L    CO2 25 21 - 32 mmol/L    Anion gap 5 5 - 15 mmol/L    Glucose 88 65 - 100 mg/dL    BUN 13 6 - 20 mg/dL    Creatinine 0.55 0.55 - 1.02 mg/dL    BUN/Creatinine ratio 24 (H) 12 - 20      GFR est AA >60 >60 ml/min/1.73m2    GFR est non-AA >60 >60 ml/min/1.73m2    Calcium 8.3 (L) 8.5 - 10.1 mg/dL    Bilirubin, total 2.4 (H) 0.2 - 1.0 mg/dL    AST (SGOT) 75 (H) 15 - 37 U/L    ALT (SGPT) 106 (H) 12 - 78 U/L    Alk. phosphatase 137 (H) 45 - 117 U/L    Protein, total 7.0 6.4 - 8.2 g/dL    Albumin 2.9 (L) 3.5 - 5.0 g/dL    Globulin 4.1 (H) 2.0 - 4.0 g/dL    A-G Ratio 0.7 (L) 1.1 - 2.2         XR Results (most recent):  Results from Hospital Encounter encounter on 10/13/21    XR CHEST PORT    Narrative  History is acute mental status change. No comparison study is available. An AP portable semierect view of the chest was obtained. The patient is leaning  toward the left. The heart is enlarged. There are cardiac monitor leads present. There is no pneumonia, pneumothorax, effusion or pulmonary edema. There is  elevation of the right hemidiaphragm. There is mild degenerative change of the  osseous structures. Impression  Cardiomegaly. No acute pulmonary disease. Please see full report. CT SPINE LUMB WO CONT   Final Result   1. Severe L2 compression fracture with approximate 75% loss of height. 2. Mild L1 compression fracture with less than 10% loss of height. 3. Multilevel degenerative changes in the lumbar spine with spinal canal   stenosis and neural foraminal narrowing. 4. Please see above report for further details. CT PELV WO CONT   Final Result   1. 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 2. There is a 13 mm left adnexal cystic lesion. This could be further evaluated   with pelvic sonogram on a nonemergent basis. CT SPINE CERV WO CONT   Final Result   1. Height loss of the C7 vertebral body, which may represent an   age-indeterminate compression fracture. This could be further evaluated with MRI   to determine if there is any bone marrow edema at this location. 2. Degenerative changes in the cervical spine.  There is mild anterolisthesis of   C4 on C5, which is favored to be related to facet osteoarthritis. Review of Systems:  Constitutional: Negative for chills and fever. HENT: Negative. Eyes: Negative. Respiratory: Negative. Cardiovascular: Negative. Gastrointestinal: Negative for abdominal pain and nausea. Skin: Negative. Neurological: Negative. Objective:   VITALS:    Visit Vitals  /77   Pulse 96   Temp 97.8 °F (36.6 °C)   Resp 18   Ht 5' 4\" (1.626 m)   Wt 72.6 kg (160 lb)   SpO2 95%   BMI 27.46 kg/m²       Physical Exam:   Constitutional: pt is oriented to person, place, and time. HENT:   Head: Normocephalic and atraumatic. Eyes: Pupils are equal, round, and reactive to light. EOM are normal.   Cardiovascular: Normal rate, regular rhythm and normal heart sounds. Pulmonary/Chest: Breath sounds normal. No wheezes. No rales. Exhibits no tenderness. Abdominal: Soft. Bowel sounds are normal. There is no abdominal tenderness. There is no rebound and no guarding. Musculoskeletal: Normal range of motion. Neurological: pt is alert and oriented to person, place, and time. Alert. Normal strength. No cranial nerve deficit or sensory deficit. Displays a negative Romberg sign. ASSESSMENT & PLAN:      L2 compression fracture  History of A.  Fib        Patient admitted to medical telemetry for  Orthopedic already consulted            ________________________________________________________________________    Signed: Erendira Benavides MD

## 2022-06-22 ENCOUNTER — APPOINTMENT (OUTPATIENT)
Dept: GENERAL RADIOLOGY | Age: 87
DRG: 478 | End: 2022-06-22
Attending: PHYSICIAN ASSISTANT
Payer: MEDICARE

## 2022-06-22 ENCOUNTER — APPOINTMENT (OUTPATIENT)
Dept: MRI IMAGING | Age: 87
DRG: 478 | End: 2022-06-22
Attending: PHYSICIAN ASSISTANT
Payer: MEDICARE

## 2022-06-22 ENCOUNTER — APPOINTMENT (OUTPATIENT)
Dept: INTERVENTIONAL RADIOLOGY/VASCULAR | Age: 87
DRG: 478 | End: 2022-06-22
Attending: PHYSICIAN ASSISTANT
Payer: MEDICARE

## 2022-06-22 LAB
ALBUMIN SERPL-MCNC: 2.5 G/DL (ref 3.5–5)
ALBUMIN/GLOB SERPL: 0.6 {RATIO} (ref 1.1–2.2)
ALP SERPL-CCNC: 107 U/L (ref 45–117)
ALT SERPL-CCNC: 59 U/L (ref 12–78)
ANION GAP SERPL CALC-SCNC: 5 MMOL/L (ref 5–15)
AST SERPL W P-5'-P-CCNC: 29 U/L (ref 15–37)
BASOPHILS # BLD: 0 K/UL (ref 0–0.1)
BASOPHILS NFR BLD: 1 % (ref 0–1)
BILIRUB SERPL-MCNC: 0.9 MG/DL (ref 0.2–1)
BUN SERPL-MCNC: 19 MG/DL (ref 6–20)
BUN/CREAT SERPL: 43 (ref 12–20)
CA-I BLD-MCNC: 8 MG/DL (ref 8.5–10.1)
CHLORIDE SERPL-SCNC: 112 MMOL/L (ref 97–108)
CO2 SERPL-SCNC: 25 MMOL/L (ref 21–32)
CREAT SERPL-MCNC: 0.44 MG/DL (ref 0.55–1.02)
DIFFERENTIAL METHOD BLD: ABNORMAL
EOSINOPHIL # BLD: 0.1 K/UL (ref 0–0.4)
EOSINOPHIL NFR BLD: 2 % (ref 0–7)
ERYTHROCYTE [DISTWIDTH] IN BLOOD BY AUTOMATED COUNT: 13.1 % (ref 11.5–14.5)
GLOBULIN SER CALC-MCNC: 4.2 G/DL (ref 2–4)
GLUCOSE SERPL-MCNC: 81 MG/DL (ref 65–100)
HCT VFR BLD AUTO: 30.8 % (ref 35–47)
HGB BLD-MCNC: 10.3 G/DL (ref 11.5–16)
IMM GRANULOCYTES # BLD AUTO: 0.1 K/UL (ref 0–0.04)
IMM GRANULOCYTES NFR BLD AUTO: 1 % (ref 0–0.5)
INR PPP: 1.1 (ref 0.9–1.1)
LYMPHOCYTES # BLD: 1.3 K/UL (ref 0.8–3.5)
LYMPHOCYTES NFR BLD: 22 % (ref 12–49)
MCH RBC QN AUTO: 32.2 PG (ref 26–34)
MCHC RBC AUTO-ENTMCNC: 33.4 G/DL (ref 30–36.5)
MCV RBC AUTO: 96.3 FL (ref 80–99)
MONOCYTES # BLD: 0.5 K/UL (ref 0–1)
MONOCYTES NFR BLD: 8 % (ref 5–13)
NEUTS SEG # BLD: 4 K/UL (ref 1.8–8)
NEUTS SEG NFR BLD: 66 % (ref 32–75)
NRBC # BLD: 0 K/UL (ref 0–0.01)
NRBC BLD-RTO: 0 PER 100 WBC
PLATELET # BLD AUTO: 150 K/UL (ref 150–400)
PMV BLD AUTO: 9.9 FL (ref 8.9–12.9)
POTASSIUM SERPL-SCNC: 3.5 MMOL/L (ref 3.5–5.1)
PROT SERPL-MCNC: 6.7 G/DL (ref 6.4–8.2)
PROTHROMBIN TIME: 13.9 SEC (ref 11.9–14.6)
RBC # BLD AUTO: 3.2 M/UL (ref 3.8–5.2)
SODIUM SERPL-SCNC: 142 MMOL/L (ref 136–145)
WBC # BLD AUTO: 6 K/UL (ref 3.6–11)

## 2022-06-22 PROCEDURE — 74011250636 HC RX REV CODE- 250/636: Performed by: PHYSICIAN ASSISTANT

## 2022-06-22 PROCEDURE — 0QB03ZX EXCISION OF LUMBAR VERTEBRA, PERCUTANEOUS APPROACH, DIAGNOSTIC: ICD-10-PCS | Performed by: RADIOLOGY

## 2022-06-22 PROCEDURE — 74011250636 HC RX REV CODE- 250/636: Performed by: FAMILY MEDICINE

## 2022-06-22 PROCEDURE — C1713 ANCHOR/SCREW BN/BN,TIS/BN: HCPCS

## 2022-06-22 PROCEDURE — 0QU03JZ SUPPLEMENT LUMBAR VERTEBRA WITH SYNTHETIC SUBSTITUTE, PERCUTANEOUS APPROACH: ICD-10-PCS | Performed by: RADIOLOGY

## 2022-06-22 PROCEDURE — 22511 PERQ LUMBOSACRAL INJECTION: CPT

## 2022-06-22 PROCEDURE — 65270000029 HC RM PRIVATE

## 2022-06-22 PROCEDURE — 99153 MOD SED SAME PHYS/QHP EA: CPT

## 2022-06-22 PROCEDURE — 77030021783 HC SYS CEM DEL MEDT -D

## 2022-06-22 PROCEDURE — 22514 PERQ VERTEBRAL AUGMENTATION: CPT

## 2022-06-22 PROCEDURE — 36415 COLL VENOUS BLD VENIPUNCTURE: CPT

## 2022-06-22 PROCEDURE — 77030041313 HC TY KYPH FRST FX MEDT -K1

## 2022-06-22 PROCEDURE — 85025 COMPLETE CBC W/AUTO DIFF WBC: CPT

## 2022-06-22 PROCEDURE — 88307 TISSUE EXAM BY PATHOLOGIST: CPT

## 2022-06-22 PROCEDURE — 80053 COMPREHEN METABOLIC PANEL: CPT

## 2022-06-22 PROCEDURE — 77030039568

## 2022-06-22 PROCEDURE — 74011000250 HC RX REV CODE- 250: Performed by: RADIOLOGY

## 2022-06-22 PROCEDURE — 72148 MRI LUMBAR SPINE W/O DYE: CPT

## 2022-06-22 PROCEDURE — 77030003451 HC NDL BIOP BN MEDT -C

## 2022-06-22 PROCEDURE — 76000 FLUOROSCOPY <1 HR PHYS/QHP: CPT

## 2022-06-22 PROCEDURE — 0QS03ZZ REPOSITION LUMBAR VERTEBRA, PERCUTANEOUS APPROACH: ICD-10-PCS | Performed by: RADIOLOGY

## 2022-06-22 PROCEDURE — 88311 DECALCIFY TISSUE: CPT

## 2022-06-22 PROCEDURE — 85610 PROTHROMBIN TIME: CPT

## 2022-06-22 PROCEDURE — 74011250636 HC RX REV CODE- 250/636: Performed by: RADIOLOGY

## 2022-06-22 PROCEDURE — 99152 MOD SED SAME PHYS/QHP 5/>YRS: CPT

## 2022-06-22 PROCEDURE — 74011250637 HC RX REV CODE- 250/637: Performed by: PHYSICIAN ASSISTANT

## 2022-06-22 RX ORDER — FENTANYL CITRATE 50 UG/ML
12.5-5 INJECTION, SOLUTION INTRAMUSCULAR; INTRAVENOUS
Status: DISCONTINUED | OUTPATIENT
Start: 2022-06-22 | End: 2022-06-27 | Stop reason: HOSPADM

## 2022-06-22 RX ORDER — MIDAZOLAM HYDROCHLORIDE 1 MG/ML
.5-2 INJECTION, SOLUTION INTRAMUSCULAR; INTRAVENOUS
Status: DISCONTINUED | OUTPATIENT
Start: 2022-06-22 | End: 2022-06-27 | Stop reason: HOSPADM

## 2022-06-22 RX ORDER — CEFAZOLIN SODIUM 1 G/3ML
2 INJECTION, POWDER, FOR SOLUTION INTRAMUSCULAR; INTRAVENOUS ONCE
Status: DISCONTINUED | OUTPATIENT
Start: 2022-06-22 | End: 2022-06-22

## 2022-06-22 RX ORDER — KETOROLAC TROMETHAMINE 30 MG/ML
15 INJECTION, SOLUTION INTRAMUSCULAR; INTRAVENOUS AS NEEDED
Status: DISCONTINUED | OUTPATIENT
Start: 2022-06-22 | End: 2022-06-25

## 2022-06-22 RX ADMIN — FENTANYL CITRATE 25 MCG: 50 INJECTION, SOLUTION INTRAMUSCULAR; INTRAVENOUS at 14:17

## 2022-06-22 RX ADMIN — CEFAZOLIN SODIUM 2 G: 1 INJECTION, POWDER, FOR SOLUTION INTRAMUSCULAR; INTRAVENOUS at 13:25

## 2022-06-22 RX ADMIN — MIDAZOLAM HYDROCHLORIDE 1 MG: 1 INJECTION, SOLUTION INTRAMUSCULAR; INTRAVENOUS at 13:55

## 2022-06-22 RX ADMIN — ENOXAPARIN SODIUM 40 MG: 100 INJECTION SUBCUTANEOUS at 09:27

## 2022-06-22 RX ADMIN — KETOROLAC TROMETHAMINE 15 MG: 30 INJECTION, SOLUTION INTRAMUSCULAR at 13:27

## 2022-06-22 RX ADMIN — FENTANYL CITRATE 50 MCG: 50 INJECTION, SOLUTION INTRAMUSCULAR; INTRAVENOUS at 13:42

## 2022-06-22 RX ADMIN — SODIUM CHLORIDE 75 ML/HR: 9 INJECTION, SOLUTION INTRAVENOUS at 05:56

## 2022-06-22 RX ADMIN — MIDAZOLAM HYDROCHLORIDE 1 MG: 1 INJECTION, SOLUTION INTRAMUSCULAR; INTRAVENOUS at 13:41

## 2022-06-22 RX ADMIN — OXYCODONE HYDROCHLORIDE 10 MG: 10 TABLET ORAL at 20:52

## 2022-06-22 RX ADMIN — KETOROLAC TROMETHAMINE 15 MG: 15 INJECTION, SOLUTION INTRAMUSCULAR; INTRAVENOUS at 05:56

## 2022-06-22 RX ADMIN — FENTANYL CITRATE 50 MCG: 50 INJECTION, SOLUTION INTRAMUSCULAR; INTRAVENOUS at 13:55

## 2022-06-22 RX ADMIN — HYDROMORPHONE HYDROCHLORIDE 0.5 MG: 1 INJECTION, SOLUTION INTRAMUSCULAR; INTRAVENOUS; SUBCUTANEOUS at 08:03

## 2022-06-22 RX ADMIN — MIDAZOLAM HYDROCHLORIDE 0.5 MG: 1 INJECTION, SOLUTION INTRAMUSCULAR; INTRAVENOUS at 14:17

## 2022-06-22 NOTE — PROGRESS NOTES
Problem: Falls - Risk of  Goal: *Absence of Falls  Description: Document Thea Antoine Fall Risk and appropriate interventions in the flowsheet. Outcome: Progressing Towards Goal  Note: Fall Risk Interventions:  Mobility Interventions: Bed/chair exit alarm         Medication Interventions: Bed/chair exit alarm,Teach patient to arise slowly,Patient to call before getting OOB    Elimination Interventions: Bed/chair exit alarm,Patient to call for help with toileting needs,Toilet paper/wipes in reach,Toileting schedule/hourly rounds,Stay With Me (per policy),Call light in reach              Problem: Patient Education: Go to Patient Education Activity  Goal: Patient/Family Education  Outcome: Progressing Towards Goal     Problem: Pain  Goal: *Control of Pain  Outcome: Progressing Towards Goal  Goal: *PALLIATIVE CARE:  Alleviation of Pain  Outcome: Progressing Towards Goal     Problem: Patient Education: Go to Patient Education Activity  Goal: Patient/Family Education  Outcome: Progressing Towards Goal     Problem: Pressure Injury - Risk of  Goal: *Prevention of pressure injury  Description: Document Deon Scale and appropriate interventions in the flowsheet.   Outcome: Progressing Towards Goal  Note: Pressure Injury Interventions:       Moisture Interventions: Absorbent underpads    Activity Interventions: Assess need for specialty bed    Mobility Interventions: HOB 30 degrees or less    Nutrition Interventions: Document food/fluid/supplement intake                     Problem: Patient Education: Go to Patient Education Activity  Goal: Patient/Family Education  Outcome: Progressing Towards Goal     Problem: Patient Education: Go to Patient Education Activity  Goal: Patient/Family Education  Outcome: Progressing Towards Goal     Problem: Pain  Goal: *Control of Pain  Outcome: Progressing Towards Goal     Problem: Pain  Goal: *PALLIATIVE CARE:  Alleviation of Pain  Outcome: Progressing Towards Goal     Problem: Patient Education: Go to Patient Education Activity  Goal: Patient/Family Education  Outcome: Progressing Towards Goal     Problem: Pressure Injury - Risk of  Goal: *Prevention of pressure injury  Description: Document Deon Scale and appropriate interventions in the flowsheet.   Outcome: Progressing Towards Goal  Note: Pressure Injury Interventions:       Moisture Interventions: Absorbent underpads    Activity Interventions: Assess need for specialty bed    Mobility Interventions: HOB 30 degrees or less    Nutrition Interventions: Document food/fluid/supplement intake                     Problem: Patient Education: Go to Patient Education Activity  Goal: Patient/Family Education  Outcome: Progressing Towards Goal

## 2022-06-22 NOTE — PROGRESS NOTES
Bedside shift change report given to Stephanie Cohen RN (oncoming nurse) by Leala Moritz, RN (offgoing nurse). Report included the following information SBAR.

## 2022-06-22 NOTE — PROGRESS NOTES
General Daily Progress Note          Patient Name:   Cherise Sy       YOB: 1935       Age:  81 y.o. Admit Date: 6/20/2022      Subjective:       Patient is a 81y.o. year old female history of A. fib not on any medications not on any anticoagulation came to ER after have a fall while using bedside commode  Complaining of severe back pain     Came to emergency room seen by ER physician     CT      IMPRESSION  1. 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 2. There is a 13 mm left adnexal cystic lesion. This could be further evaluated  with pelvic sonogram on a nonemergent basis.        1. Severe L2 compression fracture with approximate 75% loss of height. 2. Mild L1 compression fracture with less than 10% loss of height. 3. Multilevel degenerative changes in the lumbar spine with spinal canal  stenosis and neural foraminal narrowing.   4. Please see above report for further details      Patient scheduled for MRI today  Patient seen by the orthopedics and also seen by the interventional radiology           Objective:     Visit Vitals  /62   Pulse 83   Temp 97.9 °F (36.6 °C)   Resp 16   Ht 5' 4\" (1.626 m)   Wt 72.6 kg (160 lb)   SpO2 95%   BMI 27.46 kg/m²        Recent Results (from the past 24 hour(s))   MRSA SCREEN - PCR (NASAL)    Collection Time: 06/21/22  5:20 PM   Result Value Ref Range    MRSA by PCR, Nasal Not Detected Not Detected     METABOLIC PANEL, COMPREHENSIVE    Collection Time: 06/22/22  6:24 AM   Result Value Ref Range    Sodium 142 136 - 145 mmol/L    Potassium 3.5 3.5 - 5.1 mmol/L    Chloride 112 (H) 97 - 108 mmol/L    CO2 25 21 - 32 mmol/L    Anion gap 5 5 - 15 mmol/L    Glucose 81 65 - 100 mg/dL    BUN 19 6 - 20 mg/dL    Creatinine 0.44 (L) 0.55 - 1.02 mg/dL    BUN/Creatinine ratio 43 (H) 12 - 20      GFR est AA >60 >60 ml/min/1.73m2    GFR est non-AA >60 >60 ml/min/1.73m2    Calcium 8.0 (L) 8.5 - 10.1 mg/dL Bilirubin, total 0.9 0.2 - 1.0 mg/dL    AST (SGOT) 29 15 - 37 U/L    ALT (SGPT) 59 12 - 78 U/L    Alk. phosphatase 107 45 - 117 U/L    Protein, total 6.7 6.4 - 8.2 g/dL    Albumin 2.5 (L) 3.5 - 5.0 g/dL    Globulin 4.2 (H) 2.0 - 4.0 g/dL    A-G Ratio 0.6 (L) 1.1 - 2.2     CBC WITH AUTOMATED DIFF    Collection Time: 06/22/22  6:24 AM   Result Value Ref Range    WBC 6.0 3.6 - 11.0 K/uL    RBC 3.20 (L) 3.80 - 5.20 M/uL    HGB 10.3 (L) 11.5 - 16.0 g/dL    HCT 30.8 (L) 35.0 - 47.0 %    MCV 96.3 80.0 - 99.0 FL    MCH 32.2 26.0 - 34.0 PG    MCHC 33.4 30.0 - 36.5 g/dL    RDW 13.1 11.5 - 14.5 %    PLATELET 929 676 - 846 K/uL    MPV 9.9 8.9 - 12.9 FL    NRBC 0.0 0.0  WBC    ABSOLUTE NRBC 0.00 0.00 - 0.01 K/uL    NEUTROPHILS 66 32 - 75 %    LYMPHOCYTES 22 12 - 49 %    MONOCYTES 8 5 - 13 %    EOSINOPHILS 2 0 - 7 %    BASOPHILS 1 0 - 1 %    IMMATURE GRANULOCYTES 1 (H) 0 - 0.5 %    ABS. NEUTROPHILS 4.0 1.8 - 8.0 K/UL    ABS. LYMPHOCYTES 1.3 0.8 - 3.5 K/UL    ABS. MONOCYTES 0.5 0.0 - 1.0 K/UL    ABS. EOSINOPHILS 0.1 0.0 - 0.4 K/UL    ABS. BASOPHILS 0.0 0.0 - 0.1 K/UL    ABS. IMM. GRANS. 0.1 (H) 0.00 - 0.04 K/UL    DF AUTOMATED     PROTHROMBIN TIME + INR    Collection Time: 06/22/22  6:24 AM   Result Value Ref Range    Prothrombin time 13.9 11.9 - 14.6 sec    INR 1.1 0.9 - 1.1       [unfilled]      Review of Systems    Constitutional: Negative for chills and fever. HENT: Negative. Eyes: Negative. Respiratory: Negative. Cardiovascular: Negative. Gastrointestinal: Negative for abdominal pain and nausea. Skin: Negative. Neurological: Negative. Physical Exam:      Constitutional: pt is oriented to person, place, and time. HENT:   Head: Normocephalic and atraumatic. Eyes: Pupils are equal, round, and reactive to light. EOM are normal.   Cardiovascular: Normal rate, regular rhythm and normal heart sounds. Pulmonary/Chest: Breath sounds normal. No wheezes. No rales. Exhibits no tenderness. Abdominal: Soft. Bowel sounds are normal. There is no abdominal tenderness. There is no rebound and no guarding. Musculoskeletal: Normal range of motion. Neurological: pt is alert and oriented to person, place, and time. CT SPINE LUMB WO CONT   Final Result   1. Severe L2 compression fracture with approximate 75% loss of height. 2. Mild L1 compression fracture with less than 10% loss of height. 3. Multilevel degenerative changes in the lumbar spine with spinal canal   stenosis and neural foraminal narrowing. 4. Please see above report for further details. CT PELV WO CONT   Final Result   1. 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 2. There is a 13 mm left adnexal cystic lesion. This could be further evaluated   with pelvic sonogram on a nonemergent basis. CT SPINE CERV WO CONT   Final Result   1. Height loss of the C7 vertebral body, which may represent an   age-indeterminate compression fracture. This could be further evaluated with MRI   to determine if there is any bone marrow edema at this location. 2. Degenerative changes in the cervical spine. There is mild anterolisthesis of   C4 on C5, which is favored to be related to facet osteoarthritis.       MRI LUMB SPINE WO CONT    (Results Pending)        Recent Results (from the past 24 hour(s))   MRSA SCREEN - PCR (NASAL)    Collection Time: 06/21/22  5:20 PM   Result Value Ref Range    MRSA by PCR, Nasal Not Detected Not Detected     METABOLIC PANEL, COMPREHENSIVE    Collection Time: 06/22/22  6:24 AM   Result Value Ref Range    Sodium 142 136 - 145 mmol/L    Potassium 3.5 3.5 - 5.1 mmol/L    Chloride 112 (H) 97 - 108 mmol/L    CO2 25 21 - 32 mmol/L    Anion gap 5 5 - 15 mmol/L    Glucose 81 65 - 100 mg/dL    BUN 19 6 - 20 mg/dL    Creatinine 0.44 (L) 0.55 - 1.02 mg/dL    BUN/Creatinine ratio 43 (H) 12 - 20      GFR est AA >60 >60 ml/min/1.73m2    GFR est non-AA >60 >60 ml/min/1.73m2    Calcium 8.0 (L) 8.5 - 10.1 mg/dL    Bilirubin, total 0.9 0.2 - 1.0 mg/dL    AST (SGOT) 29 15 - 37 U/L    ALT (SGPT) 59 12 - 78 U/L    Alk. phosphatase 107 45 - 117 U/L    Protein, total 6.7 6.4 - 8.2 g/dL    Albumin 2.5 (L) 3.5 - 5.0 g/dL    Globulin 4.2 (H) 2.0 - 4.0 g/dL    A-G Ratio 0.6 (L) 1.1 - 2.2     CBC WITH AUTOMATED DIFF    Collection Time: 06/22/22  6:24 AM   Result Value Ref Range    WBC 6.0 3.6 - 11.0 K/uL    RBC 3.20 (L) 3.80 - 5.20 M/uL    HGB 10.3 (L) 11.5 - 16.0 g/dL    HCT 30.8 (L) 35.0 - 47.0 %    MCV 96.3 80.0 - 99.0 FL    MCH 32.2 26.0 - 34.0 PG    MCHC 33.4 30.0 - 36.5 g/dL    RDW 13.1 11.5 - 14.5 %    PLATELET 433 138 - 949 K/uL    MPV 9.9 8.9 - 12.9 FL    NRBC 0.0 0.0  WBC    ABSOLUTE NRBC 0.00 0.00 - 0.01 K/uL    NEUTROPHILS 66 32 - 75 %    LYMPHOCYTES 22 12 - 49 %    MONOCYTES 8 5 - 13 %    EOSINOPHILS 2 0 - 7 %    BASOPHILS 1 0 - 1 %    IMMATURE GRANULOCYTES 1 (H) 0 - 0.5 %    ABS. NEUTROPHILS 4.0 1.8 - 8.0 K/UL    ABS. LYMPHOCYTES 1.3 0.8 - 3.5 K/UL    ABS. MONOCYTES 0.5 0.0 - 1.0 K/UL    ABS. EOSINOPHILS 0.1 0.0 - 0.4 K/UL    ABS. BASOPHILS 0.0 0.0 - 0.1 K/UL    ABS. IMM.  GRANS. 0.1 (H) 0.00 - 0.04 K/UL    DF AUTOMATED     PROTHROMBIN TIME + INR    Collection Time: 06/22/22  6:24 AM   Result Value Ref Range    Prothrombin time 13.9 11.9 - 14.6 sec    INR 1.1 0.9 - 1.1         Results     Procedure Component Value Units Date/Time    MRSA SCREEN - PCR (NASAL) [[id number]] Collected: 06/21/22 1720    Order Status: Completed Specimen: Swab Updated: 06/21/22 2031     MRSA by PCR, Nasal Not Detected       CULTURE, URINE [[id number]]     Order Status: Sent Specimen: Urine from Clean catch            Labs:     Recent Labs     06/22/22  0624 06/21/22  0130   WBC 6.0 8.9   HGB 10.3* 11.3*   HCT 30.8* 34.1*    156     Recent Labs     06/22/22  0624 06/21/22  0130    139   K 3.5 3.4*   * 109*   CO2 25 25   BUN 19 13   CREA 0.44* 0.55   GLU 81 88   CA 8.0* 8.3*     Recent Labs     06/22/22 0624 06/21/22  0130   ALT 59 106*    137*   TBILI 0.9 2.4*   TP 6.7 7.0   ALB 2.5* 2.9*   GLOB 4.2* 4.1*     Recent Labs     06/22/22 0624   INR 1.1   PTP 13.9      No results for input(s): FE, TIBC, PSAT, FERR in the last 72 hours. No results found for: FOL, RBCF   No results for input(s): PH, PCO2, PO2 in the last 72 hours. No results for input(s): CPK, CKNDX, TROIQ in the last 72 hours. No lab exists for component: CPKMB  No results found for: CHOL, CHOLX, CHLST, CHOLV, HDL, HDLP, LDL, LDLC, DLDLP, TGLX, TRIGL, TRIGP, CHHD, CHHDX  No results found for: [facility]  Lab Results   Component Value Date/Time    Color Yellow/Straw 10/13/2021 08:26 AM    Appearance Clear 10/13/2021 08:26 AM    Specific gravity 1.012 10/13/2021 08:26 AM    pH (UA) 6.0 10/13/2021 08:26 AM    Protein Negative 10/13/2021 08:26 AM    Glucose Negative 10/13/2021 08:26 AM    Ketone Negative 10/13/2021 08:26 AM    Bilirubin Negative 10/13/2021 08:26 AM    Urobilinogen 2.0 (H) 10/13/2021 08:26 AM    Nitrites Negative 10/13/2021 08:26 AM    Leukocyte Esterase Moderate (A) 10/13/2021 08:26 AM    Bacteria Negative 10/13/2021 08:26 AM    WBC 20-50 10/13/2021 08:26 AM    RBC 0-5 10/13/2021 08:26 AM         Assessment:     L2 compression fracture  History of A.  Fib      Plan:       MRI of the lumbosacral spine  Kyphoplasty      Current Facility-Administered Medications:     0.9% sodium chloride infusion, 75 mL/hr, IntraVENous, CONTINUOUS, Marlo Rae MD, Last Rate: 75 mL/hr at 06/22/22 0556, 75 mL/hr at 06/22/22 0556    acetaminophen (TYLENOL) tablet 650 mg, 650 mg, Oral, Q6H PRN **OR** acetaminophen (TYLENOL) suppository 650 mg, 650 mg, Rectal, Q6H PRN, Marlo Rae MD    polyethylene glycol (MIRALAX) packet 17 g, 17 g, Oral, DAILY PRN, Marlo Rae MD    ondansetron (ZOFRAN ODT) tablet 4 mg, 4 mg, Oral, Q8H PRN **OR** ondansetron (ZOFRAN) injection 4 mg, 4 mg, IntraVENous, Q6H PRN, Declan Sawant MD    enoxaparin (LOVENOX) injection 40 mg, 40 mg, SubCUTAneous, DAILY, Ander Rae MD    oxyCODONE IR (ROXICODONE) tablet 5 mg, 5 mg, Oral, Q4H PRN, Wyatt Levy PA-C    oxyCODONE IR (ROXICODONE) tablet 10 mg, 10 mg, Oral, Q4H PRN, Wyatt Levy PA-C    HYDROmorphone (DILAUDID) syringe 0.5 mg, 0.5 mg, IntraVENous, Q6H PRN, Wyatt Levy PA-C, 0.5 mg at 06/22/22 0803    cyclobenzaprine (FLEXERIL) tablet 5 mg, 5 mg, Oral, TID PRN, Wyatt Levy PA-C, 5 mg at 06/21/22 8432

## 2022-06-22 NOTE — PROGRESS NOTES
CM reviewed chart. Patient pending possible kyphoplasty, PT/OT eval. Current discharge plan is home self care. Will wait for recommendations from PT/OT and discuss with patient and family.

## 2022-06-22 NOTE — PROCEDURES
Interventional Radiology Brief Procedure Note    Patient: Love Snowden MRN: [id number]  SSN: [id number]    YOB: 1935  Age: 81 y.o. 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 Performed By: Mindy Sinclair DO     Assistants: None    Anesthesia: Moderate Sedation    Estimated Blood Loss: 200mL    Specimens: L1 core bone biopsy    Implants: 10mL PMMA    Findings: Diffuse osteopenia, mild compression fracture at L1, severe compression fracture L2    Complications: None    Recommendations: Bedrest x2hr. TLSO brace when active.       Follow Up: 30d follow up appointment at [facility] with Dr. Juliano Rivera

## 2022-06-22 NOTE — PROGRESS NOTES
Problem: Falls - Risk of  Goal: *Absence of Falls  Description: Document Scarlett Minium Fall Risk and appropriate interventions in the flowsheet. Outcome: Progressing Towards Goal  Note: Fall Risk Interventions:  Mobility Interventions: Bed/chair exit alarm         Medication Interventions: Bed/chair exit alarm,Teach patient to arise slowly,Patient to call before getting OOB    Elimination Interventions: Bed/chair exit alarm,Patient to call for help with toileting needs,Toilet paper/wipes in reach,Toileting schedule/hourly rounds,Stay With Me (per policy),Call light in reach              Problem: Patient Education: Go to Patient Education Activity  Goal: Patient/Family Education  Outcome: Progressing Towards Goal     Problem: Pain  Goal: *Control of Pain  Outcome: Progressing Towards Goal  Goal: *PALLIATIVE CARE:  Alleviation of Pain  Outcome: Progressing Towards Goal     Problem: Patient Education: Go to Patient Education Activity  Goal: Patient/Family Education  Outcome: Progressing Towards Goal     Problem: Pressure Injury - Risk of  Goal: *Prevention of pressure injury  Description: Document Deon Scale and appropriate interventions in the flowsheet.   Outcome: Progressing Towards Goal  Note: Pressure Injury Interventions:       Moisture Interventions: Absorbent underpads,Moisture barrier,Limit adult briefs,Maintain skin hydration (lotion/cream)    Activity Interventions: Assess need for specialty bed,PT/OT evaluation    Mobility Interventions: HOB 30 degrees or less    Nutrition Interventions: Document food/fluid/supplement intake                     Problem: Patient Education: Go to Patient Education Activity  Goal: Patient/Family Education  Outcome: Progressing Towards Goal

## 2022-06-23 LAB
ALBUMIN SERPL-MCNC: 2.2 G/DL (ref 3.5–5)
ALBUMIN/GLOB SERPL: 0.6 {RATIO} (ref 1.1–2.2)
ALP SERPL-CCNC: 85 U/L (ref 45–117)
ALT SERPL-CCNC: 36 U/L (ref 12–78)
ANION GAP SERPL CALC-SCNC: 6 MMOL/L (ref 5–15)
AST SERPL W P-5'-P-CCNC: 21 U/L (ref 15–37)
ATRIAL RATE: 120 BPM
BILIRUB SERPL-MCNC: 0.6 MG/DL (ref 0.2–1)
BUN SERPL-MCNC: 14 MG/DL (ref 6–20)
BUN/CREAT SERPL: 33 (ref 12–20)
CA-I BLD-MCNC: 7.4 MG/DL (ref 8.5–10.1)
CALCULATED R AXIS, ECG10: 33 DEGREES
CALCULATED T AXIS, ECG11: 18 DEGREES
CHLORIDE SERPL-SCNC: 114 MMOL/L (ref 97–108)
CO2 SERPL-SCNC: 23 MMOL/L (ref 21–32)
CREAT SERPL-MCNC: 0.42 MG/DL (ref 0.55–1.02)
DIAGNOSIS, 93000: NORMAL
ERYTHROCYTE [DISTWIDTH] IN BLOOD BY AUTOMATED COUNT: 13.2 % (ref 11.5–14.5)
GLOBULIN SER CALC-MCNC: 3.5 G/DL (ref 2–4)
GLUCOSE SERPL-MCNC: 86 MG/DL (ref 65–100)
HCT VFR BLD AUTO: 25.1 % (ref 35–47)
HGB BLD-MCNC: 8.2 G/DL (ref 11.5–16)
MCH RBC QN AUTO: 31.8 PG (ref 26–34)
MCHC RBC AUTO-ENTMCNC: 32.7 G/DL (ref 30–36.5)
MCV RBC AUTO: 97.3 FL (ref 80–99)
NRBC # BLD: 0 K/UL (ref 0–0.01)
NRBC BLD-RTO: 0 PER 100 WBC
PLATELET # BLD AUTO: 140 K/UL (ref 150–400)
PMV BLD AUTO: 9.9 FL (ref 8.9–12.9)
POTASSIUM SERPL-SCNC: 3.7 MMOL/L (ref 3.5–5.1)
PROT SERPL-MCNC: 5.7 G/DL (ref 6.4–8.2)
Q-T INTERVAL, ECG07: 320 MS
QRS DURATION, ECG06: 84 MS
QTC CALCULATION (BEZET), ECG08: 427 MS
RBC # BLD AUTO: 2.58 M/UL (ref 3.8–5.2)
SODIUM SERPL-SCNC: 143 MMOL/L (ref 136–145)
VENTRICULAR RATE, ECG03: 107 BPM
WBC # BLD AUTO: 6.4 K/UL (ref 3.6–11)

## 2022-06-23 PROCEDURE — 36415 COLL VENOUS BLD VENIPUNCTURE: CPT

## 2022-06-23 PROCEDURE — 65270000029 HC RM PRIVATE

## 2022-06-23 PROCEDURE — 85027 COMPLETE CBC AUTOMATED: CPT

## 2022-06-23 PROCEDURE — 74011250636 HC RX REV CODE- 250/636: Performed by: FAMILY MEDICINE

## 2022-06-23 PROCEDURE — 74011250637 HC RX REV CODE- 250/637: Performed by: PHYSICIAN ASSISTANT

## 2022-06-23 PROCEDURE — 94762 N-INVAS EAR/PLS OXIMTRY CONT: CPT

## 2022-06-23 PROCEDURE — 80053 COMPREHEN METABOLIC PANEL: CPT

## 2022-06-23 PROCEDURE — 74011250637 HC RX REV CODE- 250/637: Performed by: FAMILY MEDICINE

## 2022-06-23 PROCEDURE — 93005 ELECTROCARDIOGRAM TRACING: CPT

## 2022-06-23 RX ORDER — ENOXAPARIN SODIUM 100 MG/ML
40 INJECTION SUBCUTANEOUS DAILY
Qty: 30 EACH | Refills: 0 | Status: SHIPPED | OUTPATIENT
Start: 2022-06-23

## 2022-06-23 RX ORDER — METOPROLOL TARTRATE 25 MG/1
25 TABLET, FILM COATED ORAL ONCE
Status: COMPLETED | OUTPATIENT
Start: 2022-06-23 | End: 2022-06-23

## 2022-06-23 RX ORDER — METOPROLOL TARTRATE 25 MG/1
25 TABLET, FILM COATED ORAL 2 TIMES DAILY
Status: DISCONTINUED | OUTPATIENT
Start: 2022-06-23 | End: 2022-06-27 | Stop reason: HOSPADM

## 2022-06-23 RX ORDER — OXYCODONE HYDROCHLORIDE 5 MG/1
5 TABLET ORAL
Qty: 15 TABLET | Refills: 0 | Status: SHIPPED | OUTPATIENT
Start: 2022-06-23 | End: 2022-06-26

## 2022-06-23 RX ORDER — CYCLOBENZAPRINE HCL 10 MG
5 TABLET ORAL
Qty: 30 TABLET | Refills: 0 | Status: SHIPPED | OUTPATIENT
Start: 2022-06-23

## 2022-06-23 RX ADMIN — SODIUM CHLORIDE 75 ML/HR: 9 INJECTION, SOLUTION INTRAVENOUS at 03:01

## 2022-06-23 RX ADMIN — OXYCODONE HYDROCHLORIDE 10 MG: 10 TABLET ORAL at 08:19

## 2022-06-23 RX ADMIN — OXYCODONE HYDROCHLORIDE 10 MG: 10 TABLET ORAL at 01:55

## 2022-06-23 RX ADMIN — METOPROLOL TARTRATE 25 MG: 25 TABLET, FILM COATED ORAL at 15:20

## 2022-06-23 RX ADMIN — OXYCODONE HYDROCHLORIDE 10 MG: 10 TABLET ORAL at 13:10

## 2022-06-23 RX ADMIN — OXYCODONE HYDROCHLORIDE 10 MG: 10 TABLET ORAL at 21:00

## 2022-06-23 RX ADMIN — ENOXAPARIN SODIUM 40 MG: 100 INJECTION SUBCUTANEOUS at 09:59

## 2022-06-23 NOTE — PROGRESS NOTES
Problem: Falls - Risk of  Goal: *Absence of Falls  Description: Document Sindhu Nicolas Fall Risk and appropriate interventions in the flowsheet.   Outcome: Progressing Towards Goal  Note: Fall Risk Interventions:  Mobility Interventions: Bed/chair exit alarm         Medication Interventions: Patient to call before getting OOB    Elimination Interventions: Bed/chair exit alarm,Call light in reach,Patient to call for help with toileting needs    History of Falls Interventions: Bed/chair exit alarm

## 2022-06-23 NOTE — PROGRESS NOTES
Problem: Falls - Risk of  Goal: *Absence of Falls  Description: Document [place] Fall Risk and appropriate interventions in the flowsheet. Outcome: Progressing Towards Goal  Note: Fall Risk Interventions:  Mobility Interventions: Bed/chair exit alarm,Patient to call before getting OOB         Medication Interventions: Patient to call before getting OOB,Teach patient to arise slowly    Elimination Interventions: Patient to call for help with toileting needs,Call light in reach,Toileting schedule/hourly rounds              Problem: Patient Education: Go to Patient Education Activity  Goal: Patient/Family Education  Outcome: Progressing Towards Goal     Problem: Pain  Goal: *Control of Pain  Outcome: Progressing Towards Goal  Goal: *PALLIATIVE CARE:  Alleviation of Pain  Outcome: Progressing Towards Goal     Problem: Patient Education: Go to Patient Education Activity  Goal: Patient/Family Education  Outcome: Progressing Towards Goal     Problem: Pressure Injury - Risk of  Goal: *Prevention of pressure injury  Description: Document Deon Scale and appropriate interventions in the flowsheet. Outcome: Progressing Towards Goal  Note: Pressure Injury Interventions:       Moisture Interventions: Internal/External urinary devices,Minimize layers,Maintain skin hydration (lotion/cream)    Activity Interventions: Assess need for specialty bed,PT/OT evaluation    Mobility Interventions: HOB 30 degrees or less,Turn and reposition approx.  every two hours(pillow and wedges)    Nutrition Interventions: Document food/fluid/supplement intake                     Problem: Patient Education: Go to Patient Education Activity  Goal: Patient/Family Education  Outcome: Progressing Towards Goal

## 2022-06-23 NOTE — DISCHARGE SUMMARY
Discharge Summary       PATIENT ID: Carilyn Kussmaul  MRN: [id number]   YOB: 1935    DATE OF ADMISSION: 6/20/2022  6:47 PM    DATE OF DISCHARGE:   PRIMARY CARE PROVIDER: None     ATTENDING PHYSICIAN: Violet Rae  DISCHARGING PROVIDER: Violet Rae      CONSULTATIONS: IP CONSULT TO HOSPITALIST  IP CONSULT TO ORTHOPEDIC SURGERY  IP CONSULT TO INTERVENTIONAL RADIOLOGY    PROCEDURES/SURGERIES: * No surgery found *    ADMITTING DIAGNOSES:    Patient Active Problem List    Diagnosis Date Noted    Compression fracture of body of thoracic vertebra ([facility] 06/21/2022    Lumbar compression fracture ([facility] 06/20/2022    Intractable low back pain 06/20/2022    UTI (urinary tract infection) 10/13/2021    Altered mental status 10/13/2021    Systemic adverse effect of COVID-19 vaccine 10/13/2021    Sepsis ([facility] 10/13/2021    Infectious encephalopathy 10/13/2021       DISCHARGE DIAGNOSES / PLAN:      L2 compression fracture  History of A. Fib  L1 and L2 Kyphoplasty and L1 biopsy        DISCHARGE MEDICATIONS:  Current Discharge Medication List      START taking these medications    Details   cyclobenzaprine (FLEXERIL) 10 mg tablet Take 0.5 Tablets by mouth three (3) times daily as needed for Muscle Spasm(s). Qty: 30 Tablet, Refills: 0  Start date: 6/23/2022      enoxaparin (LOVENOX) 40 mg/0.4 mL 0.4 mL by SubCUTAneous route daily. Qty: 30 Each, Refills: 0  Start date: 6/23/2022      oxyCODONE IR (ROXICODONE) 5 mg immediate release tablet Take 1 Tablet by mouth every four (4) hours as needed for Pain for up to 3 days.  Max Daily Amount: 30 mg.  Qty: 15 Tablet, Refills: 0  Start date: 6/23/2022, End date: 6/26/2022    Associated Diagnoses: Compression fracture of L1 vertebra, initial encounter ([facility]         STOP taking these medications       acetaminophen (TYLENOL) 500 mg tablet Comments:   Reason for Stopping:                 NOTIFY YOUR PHYSICIAN FOR ANY OF THE FOLLOWING:   Fever over 101 degrees for 24 hours.   Chest pain, shortness of breath, fever, chills, nausea, vomiting, diarrhea, change in mentation, falling, weakness, bleeding. Severe pain or pain not relieved by medications. Or, any other signs or symptoms that you may have questions about. DISPOSITION:  x  Home With:   OT  PT  HH  RN       Long term SNF/Inpatient Rehab    Independent/assisted living    Hospice    Other:       PATIENT CONDITION AT DISCHARGE: Stable      PHYSICAL EXAMINATION AT DISCHARGE:  General:          Alert, cooperative, no distress, appears stated age. HEENT:           Atraumatic, anicteric sclerae, pink conjunctivae                          No oral ulcers, mucosa moist, throat clear, dentition fair  Neck:               Supple, symmetrical  Lungs:             Clear to auscultation bilaterally. No Wheezing or Rhonchi. No rales. Chest wall:      No tenderness  No Accessory muscle use. Heart:              Regular  rhythm,  No  murmur   No edema  Abdomen:        Soft, non-tender. Not distended. Bowel sounds normal  Extremities:     No cyanosis. No clubbing,                            Skin turgor normal, Capillary refill normal  Skin:                Not pale. Not Jaundiced  No rashes   Psych:             Not anxious or agitated. Neurologic:      Alert, moves all extremities, answers questions appropriately and responds to commands     MRI LUMB SPINE WO CONT   Final Result   1. Compression fractures of L1 and L2 as above. Heterogeneous signal within the   L1 vertebral body, which may represent an underlying lesion, such as a   hemangioma or other. Correlate clinically for possibility of malignancy. 2.  Abnormal ventral epidural signal at the level of T12, which may represent   superior extension of ventral epidural hemorrhage secondary to L1 vertebral body   fracture. Alternatively, findings may represent a caudally migrating disc   extrusion.    3.  Associated posterior paraspinal and anterior paravertebral edema with edema   extending down the left psoas. 4.  Multilevel degenerative changes of the lumbar spine as detailed by level   above. CT SPINE LUMB WO CONT   Final Result   1. Severe L2 compression fracture with approximate 75% loss of height. 2. Mild L1 compression fracture with less than 10% loss of height. 3. Multilevel degenerative changes in the lumbar spine with spinal canal   stenosis and neural foraminal narrowing. 4. Please see above report for further details. CT PELV WO CONT   Final Result   1. 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 2. There is a 13 mm left adnexal cystic lesion. This could be further evaluated   with pelvic sonogram on a nonemergent basis. CT SPINE CERV WO CONT   Final Result   1. Height loss of the C7 vertebral body, which may represent an   age-indeterminate compression fracture. This could be further evaluated with MRI   to determine if there is any bone marrow edema at this location. 2. Degenerative changes in the cervical spine. There is mild anterolisthesis of   C4 on C5, which is favored to be related to facet osteoarthritis.       IR KYPHOPLASTY LUMBAR    (Results Pending)   XR FLUOROSCOPY UNDER 60 MINUTES    (Results Pending)   IR VERTEBROPLASTY FLUOROSCOPIC    (Results Pending)        Recent Results (from the past 24 hour(s))   CBC W/O DIFF    Collection Time: 06/23/22  4:58 AM   Result Value Ref Range    WBC 6.4 3.6 - 11.0 K/uL    RBC 2.58 (L) 3.80 - 5.20 M/uL    HGB 8.2 (L) 11.5 - 16.0 g/dL    HCT 25.1 (L) 35.0 - 47.0 %    MCV 97.3 80.0 - 99.0 FL    MCH 31.8 26.0 - 34.0 PG    MCHC 32.7 30.0 - 36.5 g/dL    RDW 13.2 11.5 - 14.5 %    PLATELET 457 (L) 212 - 400 K/uL    MPV 9.9 8.9 - 12.9 FL    NRBC 0.0 0.0  WBC    ABSOLUTE NRBC 0.00 0.00 - 2.43 K/uL   METABOLIC PANEL, COMPREHENSIVE    Collection Time: 06/23/22  4:58 AM   Result Value Ref Range    Sodium 143 136 - 145 mmol/L    Potassium 3.7 3.5 - 5.1 mmol/L    Chloride 114 (H) 97 - 108 mmol/L    CO2 23 21 - 32 mmol/L    Anion gap 6 5 - 15 mmol/L    Glucose 86 65 - 100 mg/dL    BUN 14 6 - 20 mg/dL    Creatinine 0.42 (L) 0.55 - 1.02 mg/dL    BUN/Creatinine ratio 33 (H) 12 - 20      GFR est AA >60 >60 ml/min/1.73m2    GFR est non-AA >60 >60 ml/min/1.73m2    Calcium 7.4 (L) 8.5 - 10.1 mg/dL    Bilirubin, total 0.6 0.2 - 1.0 mg/dL    AST (SGOT) 21 15 - 37 U/L    ALT (SGPT) 36 12 - 78 U/L    Alk. phosphatase 85 45 - 117 U/L    Protein, total 5.7 (L) 6.4 - 8.2 g/dL    Albumin 2.2 (L) 3.5 - 5.0 g/dL    Globulin 3.5 2.0 - 4.0 g/dL    A-G Ratio 0.6 (L) 1.1 - 2.2            HOSPITAL COURSE:    Patient is Yany.Kos y.o. year old female history of A. fib not on any medications not on any anticoagulation came to ER after have a fall while using bedside commode  Complaining of severe back pain     Came to emergency room seen by ER physician     CT      IMPRESSION  1. 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 2. There is a 13 mm left adnexal cystic lesion. This could be further evaluated  with pelvic sonogram on a nonemergent basis.        1. Severe L2 compression fracture with approximate 75% loss of height. 2. Mild L1 compression fracture with less than 10% loss of height. 3. Multilevel degenerative changes in the lumbar spine with spinal canal  stenosis and neural foraminal narrowing.   4. Please see above report for further details      Patient seen by the orthopedics and also seen with interventional radiology patient had   L1 and L2 Kyphoplasty and L1 biopsy    Patient tolerated the procedure well patient alert awake denies any chest pain shortness of breath nausea vomiting      Patient discharged to skilled care rehab    Medication reconciliation done time discharge patient 35 minutes 50% time spent counseling and coordination of care      Signed:   Oracio Nash MD  6/23/2022  8:50 AM

## 2022-06-23 NOTE — PROGRESS NOTES
Patient with discharge order to SNF, no PT or OT eval available or order. Orders placed for PT and OT to see patient. Will need for insurance authorization for SNF. Was also informed by nurse that patient has elevated heart rate and informed physician. Orders to monitor heart rate. Patient pending eval from PT/OT for SNF.

## 2022-06-23 NOTE — PROGRESS NOTES
Orthopedic progress note    Date:2022       Room:[facility]  Patient Carmen Monge     YOB: 1935     Age:87 y.o. Subjective    81year-old female seen in follow-up for L1/L2 compression fracture. Patient had a kyphoplasty procedure performed yesterday. Patient states she feels a little bit better since yesterday. She has not been out of bed yet. Objective           Vitals Last 24 Hours:  TEMPERATURE:  Temp  Av.1 °F (36.7 °C)  Min: 97.8 °F (36.6 °C)  Max: 98.4 °F (36.9 °C)  RESPIRATIONS RANGE: Resp  Av  Min: 16  Max: 16  PULSE OXIMETRY RANGE: SpO2  Av.8 %  Min: 95 %  Max: 99 %  PULSE RANGE: Pulse  Av.3  Min: 66  Max: 102  BLOOD PRESSURE RANGE: Systolic (96ELA), YW , Min:100 , BON:798   ; Diastolic (01IEO), TCE:94, Min:54, Max:62    Current Facility-Administered Medications   Medication Dose Route Frequency    midazolam (VERSED) injection 0.5-2 mg  0.5-2 mg IntraVENous Rad Multiple    fentaNYL citrate (PF) injection 12.5-50 mcg  12.5-50 mcg IntraVENous Multiple    ketorolac (TORADOL) injection 15 mg  15 mg IntraVENous PRN    0.9% sodium chloride infusion  75 mL/hr IntraVENous CONTINUOUS    acetaminophen (TYLENOL) tablet 650 mg  650 mg Oral Q6H PRN    Or    acetaminophen (TYLENOL) suppository 650 mg  650 mg Rectal Q6H PRN    polyethylene glycol (MIRALAX) packet 17 g  17 g Oral DAILY PRN    ondansetron (ZOFRAN ODT) tablet 4 mg  4 mg Oral Q8H PRN    Or    ondansetron (ZOFRAN) injection 4 mg  4 mg IntraVENous Q6H PRN    enoxaparin (LOVENOX) injection 40 mg  40 mg SubCUTAneous DAILY    oxyCODONE IR (ROXICODONE) tablet 5 mg  5 mg Oral Q4H PRN    oxyCODONE IR (ROXICODONE) tablet 10 mg  10 mg Oral Q4H PRN    HYDROmorphone (DILAUDID) syringe 0.5 mg  0.5 mg IntraVENous Q6H PRN    cyclobenzaprine (FLEXERIL) tablet 5 mg  5 mg Oral TID PRN          I/O (24Hr):     Intake/Output Summary (Last 24 hours) at 2022 1034  Last data filed at 2022  Gross per 24 hour   Intake 150 ml   Output 300 ml   Net -150 ml     Objective  Labs/Imaging/Diagnostics    Labs:  CBC:  Recent Labs     06/23/22  0458 06/22/22  0624 06/21/22  0130   WBC 6.4 6.0 8.9   RBC 2.58* 3.20* 3.52*   HGB 8.2* 10.3* 11.3*   HCT 25.1* 30.8* 34.1*   MCV 97.3 96.3 96.9   RDW 13.2 13.1 13.4   * 150 156     CHEMISTRIES:  Recent Labs     06/23/22  0458 06/22/22  0624 06/21/22  0130    142 139   K 3.7 3.5 3.4*   * 112* 109*   CO2 23 25 25   BUN 14 19 13   CREA 0.42* 0.44* 0.55   CA 7.4* 8.0* 8.3*   PT/INR:  Recent Labs     06/22/22 0624   INR 1.1     APTT:No results for input(s): APTT in the last 72 hours. LIVER PROFILE:  Recent Labs     06/23/22 0458 06/22/22  0624 06/21/22  0130   AST 21 29 75*   ALT 36 59 106*     Lab Results   Component Value Date/Time    ALT (SGPT) 36 06/23/2022 04:58 AM    AST (SGOT) 21 06/23/2022 04:58 AM    Alk. phosphatase 85 06/23/2022 04:58 AM    Bilirubin, direct 0.7 (H) 10/14/2021 03:27 AM    Bilirubin, total 0.6 06/23/2022 04:58 AM       Physical Exam:  Back Limited: There is improved pain with palpation to the procedure site. Lower extremities: Still moderate discomfort with any movement of her lower extremities right worse than left. No calf pain. Sensation intact throughout bilateral lower extremities. EHL/DF/PF is 5 out of 5.     Assessment//Plan           Patient Active Problem List    Diagnosis Date Noted    Compression fracture of body of thoracic vertebra ([address] 06/21/2022    Lumbar compression fracture ([address] 06/20/2022    Intractable low back pain 06/20/2022    UTI (urinary tract infection) 10/13/2021    Altered mental status 10/13/2021    Systemic adverse effect of COVID-19 vaccine 10/13/2021    Sepsis ([address] 10/13/2021    Infectious encephalopathy 10/13/2021     Status post L1-L2 kyphoplasty secondary to compression fracture  Continue with pain management  Patient can follow-up with Dr. Lyndsey Gutierres as outpatient as needed.       Electronically signed by Nasra Schmitz PA-C on 6/23/2022 at 10:34 AM

## 2022-06-23 NOTE — PROGRESS NOTES
[facility] BRIEF INTERIM SUMMARY    NAME: Tony Watson   :  1935   MRN:  [id number]     Date/Time:  2022 4:49 PM    Interim Hospital Summary: 81 y.o. Female, w/ h/o AFib, p/w severe back pain with minimal trauma, who was found to have osteoporotic fractures of L1/L2. Patient is POD#1 s/p L1/L2 kyphoplasties. - Back incisions CDI  - BLE motor/sensory intact  - Patient's back pain has much improved    - Recommend PT/OT assessment and treatment after the kyphoplasty, please arrange for out-pt PT/OT vs. rehab when appropriate  - TLSO brace when tolerated  - [facility] will follow patient in a month upon discharge    - Please call [phone] with any questions. - Thanks for involving [facility] in patient's care.     Lala Kussmaul, MD PhD

## 2022-06-23 NOTE — PROGRESS NOTES
EKG done and it is showing Atrial fibrillation with rapid ventricular response along with non specific T wave abnormality.

## 2022-06-23 NOTE — PROGRESS NOTES
Patient's heart rate remaining above 100. Dr Marleni Henderson notified and ordered Lopressor 25 mg twice daily.  Order placed by this RN

## 2022-06-23 NOTE — PROGRESS NOTES
Dr Jani Caldwell was notified about patient hear rate elevation. Dr Jani Caldwell requested for HR to be monitored at this time.

## 2022-06-24 ENCOUNTER — APPOINTMENT (OUTPATIENT)
Dept: NON INVASIVE DIAGNOSTICS | Age: 87
DRG: 478 | End: 2022-06-24
Attending: INTERNAL MEDICINE
Payer: MEDICARE

## 2022-06-24 PROCEDURE — 97165 OT EVAL LOW COMPLEX 30 MIN: CPT

## 2022-06-24 PROCEDURE — 74011250637 HC RX REV CODE- 250/637: Performed by: FAMILY MEDICINE

## 2022-06-24 PROCEDURE — 74011250637 HC RX REV CODE- 250/637: Performed by: PHYSICIAN ASSISTANT

## 2022-06-24 PROCEDURE — 97530 THERAPEUTIC ACTIVITIES: CPT

## 2022-06-24 PROCEDURE — 93306 TTE W/DOPPLER COMPLETE: CPT

## 2022-06-24 PROCEDURE — 97162 PT EVAL MOD COMPLEX 30 MIN: CPT

## 2022-06-24 PROCEDURE — 74011250636 HC RX REV CODE- 250/636: Performed by: PHYSICIAN ASSISTANT

## 2022-06-24 PROCEDURE — 94762 N-INVAS EAR/PLS OXIMTRY CONT: CPT

## 2022-06-24 PROCEDURE — 74011250637 HC RX REV CODE- 250/637: Performed by: INTERNAL MEDICINE

## 2022-06-24 PROCEDURE — 65270000029 HC RM PRIVATE

## 2022-06-24 PROCEDURE — 74011250636 HC RX REV CODE- 250/636: Performed by: FAMILY MEDICINE

## 2022-06-24 RX ORDER — METOPROLOL TARTRATE 25 MG/1
25 TABLET, FILM COATED ORAL 2 TIMES DAILY
Qty: 60 TABLET | Refills: 0 | Status: SHIPPED | OUTPATIENT
Start: 2022-06-24

## 2022-06-24 RX ADMIN — POLYETHYLENE GLYCOL 3350 17 G: 17 POWDER, FOR SOLUTION ORAL at 17:11

## 2022-06-24 RX ADMIN — HYDROMORPHONE HYDROCHLORIDE 0.5 MG: 1 INJECTION, SOLUTION INTRAMUSCULAR; INTRAVENOUS; SUBCUTANEOUS at 03:29

## 2022-06-24 RX ADMIN — OXYCODONE HYDROCHLORIDE 10 MG: 10 TABLET ORAL at 08:09

## 2022-06-24 RX ADMIN — OXYCODONE HYDROCHLORIDE 10 MG: 10 TABLET ORAL at 00:57

## 2022-06-24 RX ADMIN — SODIUM CHLORIDE 75 ML/HR: 9 INJECTION, SOLUTION INTRAVENOUS at 07:12

## 2022-06-24 RX ADMIN — ENOXAPARIN SODIUM 40 MG: 100 INJECTION SUBCUTANEOUS at 08:08

## 2022-06-24 RX ADMIN — METOPROLOL TARTRATE 25 MG: 25 TABLET, FILM COATED ORAL at 20:48

## 2022-06-24 RX ADMIN — HYDROMORPHONE HYDROCHLORIDE 0.5 MG: 1 INJECTION, SOLUTION INTRAMUSCULAR; INTRAVENOUS; SUBCUTANEOUS at 11:37

## 2022-06-24 RX ADMIN — APIXABAN 5 MG: 5 TABLET, FILM COATED ORAL at 20:48

## 2022-06-24 RX ADMIN — METOPROLOL TARTRATE 25 MG: 25 TABLET, FILM COATED ORAL at 08:09

## 2022-06-24 RX ADMIN — SODIUM CHLORIDE 75 ML/HR: 9 INJECTION, SOLUTION INTRAVENOUS at 22:05

## 2022-06-24 NOTE — PROGRESS NOTES
Call made to patient son/step-son Osman Eaton ([phone]) to discuss recommendations made my PT/OT for SNF. We spoke briefly but had call from the physician on other line and told him to take that and I will call him back. While waiting to call him back spoke with patient about recommendations. Patient reluctantly agreeable and would  prefer to be close to home. Patient lives in [place] and spoke to her about [facility], [facility], and [facility] and went over rating system. received permission to send referral to [facility] and rehab. Tried to call son back to discuss further, no answer, left message to return call.

## 2022-06-24 NOTE — PROGRESS NOTES
PHYSICAL THERAPY EVALUATION  Patient: Otis Easton [de-identified]81 y.o. female)  Date: 6/24/2022  Primary Diagnosis: Lumbar compression fracture (HCC) [S32.000A]  Intractable low back pain [M54.59]  Compression fracture of body of thoracic vertebra ([address] [S22.000A]       Precautions: spine precautions      ASSESSMENT  Pt is an 80 yo female admitted on 6/20/2022 for [facility]F from [facility] at home ; dx l1-L2 compression fx,kyphoplasty 6/22/2022. As per nursing patient was doing well after the sx and was seen ambulating to the door but than something happened and she is in lot of pain now. Per  IR notes on 6/23 patient may use the TLSO for mobility for comfort. PMH: As below per H&P. Pt A&O x 4.please refer to home info in flow sheet. Patient recently lost her . .    Based on the objective data described below, the patient presents with generalized weakness, impaired functional mobility, impaired amb, impaired balance, and impaired safety. Pt with OT upon PT arrival(was called to assist OT for mobility), agreeable to evaluation. Pt required max Ax 2 for (bed mobility,  supine <> sit),TLSO was applied in sitting ,Min to mod A for sit <> stand transfers. Pt amb 25 feet to the bathroom sat on commode with min to mod assist with gt belt, RW; demonstrating unsteady gt pattern with unsafe mobility noted. Pt did fair to poor with session today with PT/OT. Pt will benefit from continued skilled PT to address above deficits and return to PLOF. Current PT DC recommendation snf  due to above deficits. Current Level of Function Impacting Discharge (mobility/balance): patient requires mod to max assist for all mobiiity    Other factors to consider for discharge: SNf      PLAN :  Recommendations and Planned Interventions: bed mobility training, transfer training, gait training, therapeutic exercises, patient and family training/education and therapeutic activities      Recommend with staff: log rolling. TLSO in oob activities    Frequency/Duration: Patient will be followed by physical therapy:  3-5x/week to address goals. Recommendation for discharge: (in order for the patient to meet his/her long term goals)  Justin Taylor discharge recommendation:  Has been made in collaboration with the attending provider and/or case management    IF patient discharges home will need the following DME: to be determined (TBD)         SUBJECTIVE:   Patient stated I am hurting.     OBJECTIVE DATA SUMMARY:   HISTORY:    Past Medical History:   Diagnosis Date    A-fib [facility])      Past Surgical History:   Procedure Laterality Date    IR KYPHOPLASTY LUMBAR  6/22/2022    IR VERTEBROPLASTY FLUOROSCOPIC  6/22/2022       Home Situation  Home Environment: Private residence  # Steps to Enter: 5  Rails to Enter: Yes  Hand Rails : Bilateral  Wheelchair Ramp: Yes  One/Two Story Residence: One story  Living Alone: Yes  Support Systems: Child(jannet)  Patient Expects to be Discharged to[de-identified] Skilled nursing facility  Current DME Used/Available at Home: Walker, rolling    EXAMINATION/PRESENTATION/DECISION MAKING:   Critical Behavior:  Neurologic State: Alert  Orientation Level: Oriented X4  Cognition: Decreased attention/concentration     Hearing: Auditory  Auditory Impairment: None      Range Of Motion:  AROM: Generally decreased, functional                       Strength:    Strength: Generally decreased, functional                    Tone & Sensation:                                  Coordination:  Coordination: Generally decreased, functional       Functional Mobility:  Bed Mobility:  Rolling: Maximum assistance;Assist x2  Supine to Sit: Maximum assistance;Assist x2  Sit to Supine: Maximum assistance;Assist x2  Scooting: Maximum assistance  Transfers:  Sit to Stand: Minimum assistance; Additional time;Assist x2  Stand to Sit: Minimum assistance; Additional time;Assist x2                       Balance:   Sitting: Impaired; With support  Sitting - Static: Fair (occasional)  Sitting - Dynamic: Fair (occasional)  Standing: Impaired;Pull to stand; With support  Standing - Static: Fair;Constant support  Standing - Dynamic : Constant support; Fair  Ambulation/Gait Training:  Distance (ft): 25 Feet (ft)  Assistive Device: Walker, rolling;Gait belt; Other (comment) (TLSO)  Ambulation - Level of Assistance: Minimal assistance; Moderate assistance;Assist x2     Gait Description (WDL): Exceptions to [facility]                                     Functional Measure:  [facility] AM-PAC 6 Clicks         Basic Mobility Inpatient Short Form  How much difficulty does the patient currently have. .. Unable A Lot A Little None   1. Turning over in bed (including adjusting bedclothes, sheets and blankets)? [] 1   [x] 2   [] 3   [] 4   2. Sitting down on and standing up from a chair with arms ( e.g., wheelchair, bedside commode, etc.)   [] 1   [x] 2   [] 3   [] 4   3. Moving from lying on back to sitting on the side of the bed? [] 1   [x] 2   [] 3   [] 4          How much help from another person does the patient currently need. .. Total A Lot A Little None   4. Moving to and from a bed to a chair (including a wheelchair)? [] 1   [x] 2   [] 3   [] 4   5. Need to walk in hospital room? [] 1   [x] 2   [] 3   [] 4   6. Climbing 3-5 steps with a railing? [x] 1   [] 2   [] 3   [] 4   © 2007, Trustees of [facility], under license to [employer]. All rights reserved     Score:  Initial: 11/24 Most Recent: X (Date: 06/24/2022 )   Interpretation of Tool:  Represents activities that are increasingly more difficult (i.e. Bed mobility, Transfers, Gait).   Score 24 23 22-20 19-15 14-10 9-7 6   Modifier CH CI CJ CK CL CM CN         Physical Therapy Evaluation Charge Determination   History Examination Presentation Decision-Making   LOW Complexity : Zero comorbidities / personal factors that will impact the outcome / POC LOW Complexity : 1-2 Standardized tests and measures addressing body structure, function, activity limitation and / or participation in recreation  LOW Complexity : Stable, uncomplicated  Other outcome measures [facility] 6  11/24      Based on the above components, the patient evaluation is determined to be of the following complexity level: LOW     Pain Rating:  10/10 back  With spasms    Activity Tolerance:  poor    After treatment patient left in no apparent distress:   Supine in bed, Heels elevated for pressure relief, Call bell within reach, Bed / chair alarm activated and Side rails x 3 and board updated. GOALS:    Problem: Mobility Impaired (Adult and Pediatric)  Goal: *Acute Goals and Plan of Care (Insert Text)  Description: Patient stated goal:get rid of the pain  Patient will move from supine to sit and sit to supine , scoot up and down, and roll side to side in bed with modified independence within 7 day(s). Patient will transfer from bed to chair and chair to bed with modified independence using the least restrictive device within 7 day(s). Patient will improve static standing balance to modified independence within 1 week(s). Patient will ambulate 50 feet with minimal assistance with least restrictive device within 1 weeks. Outcome: Progressing Towards Goal       COMMUNICATION/EDUCATION:   The patients plan of care was discussed with: Occupational therapist, Registered nurse and Case management. Fall prevention education was provided and the patient/caregiver indicated understanding., Patient/family have participated as able in goal setting and plan of care. and Patient/family agree to work toward stated goals and plan of care. Thank you for this referral.  Sabina Vera, PT.    Time Calculation: 35 mins

## 2022-06-24 NOTE — CONSULTS
Cardiology Consult    NAME: Cherise Sy   :  1935   MRN:  [id number]     Date/Time:  2022 11:42 AM    Patient PCP: None  ________________________________________________________________________     Assessment/Plan:     Atrial fibrillation with rapid ventricular response, now rate is better controlled we will recommend anticoagulation, risks and benefits discussed with the patient and son Mr. Claude Solar. Will check TSH. Obtain echo. Obtain orthopedics approval for timing of anticoagulation. Anemia, will continue to monitor hemoglobin    Back pain, compression fracture, status post kyphoplasty           []        High complexity decision making was performed        Subjective:   CHIEF COMPLAINT:   Back pain    REASON FOR CONSULT:  Fibrillation with rapid ventricular response    HISTORY OF PRESENT ILLNESS:     Cherise Sy is a 81 y.o.  female who back pain. Found with compression fracture of L1-L2, had kyphoplasty. Patient is with chronic A. fib. Not anticoagulated. Patient denies any cardiac issues. Patient on last admission was also in A. fib. Thyroid last checked was normal.  Currently on metoprolol, heart rate is well controlled. Option of anticoagulation discussed with patient with risks and benefits as well as with son Mr. Claude Solar. She also does have anemia. Past Medical History:   Diagnosis Date    A-fib [facility])       No past surgical history on file. No Known Allergies   Meds:  See below  Social History     Tobacco Use    Smoking status: Not on file    Smokeless tobacco: Not on file   Substance Use Topics    Alcohol use: Not on file      No family history on file.     REVIEW OF SYSTEMS:     []         Unable to obtain  ROS due to ---   [x]         Total of 12 systems reviewed as follows:    Constitutional: negative fever, negative chills, negative weight loss  Eyes:   negative visual changes  ENT:   negative sore throat, tongue or lip swelling  Respiratory:  negative cough, negative dyspnea  Cards:  HPI  GI:   negative for nausea, vomiting, diarrhea, and abdominal pain  Genitourinary: negative for frequency, dysuria  Integument:  negative for rash   Hematologic:  negative for easy bruising and gum/nose bleeding  Musculoskel: negative for myalgias,  back pain  Neurological:  negative for headaches, dizziness, vertigo, weakness  Behavl/Psych: negative for feelings of anxiety, depression     Pertinent Positives include :    Objective:      Physical Exam:    Last 24hrs VS reviewed since prior progress note. Most recent are:    Visit Vitals  BP (!) 107/58 (BP 1 Location: Right upper arm, BP Patient Position: At rest)   Pulse 94   Temp 98.2 °F (36.8 °C)   Resp 18   Ht 5' 4\" (1.626 m)   Wt 72.6 kg (160 lb)   SpO2 95%   BMI 27.46 kg/m²       Intake/Output Summary (Last 24 hours) at 6/24/2022 1142  Last data filed at 6/24/2022 0520  Gross per 24 hour   Intake 1125 ml   Output 1300 ml   Net -175 ml        General Appearance: Well developed, alert, no acute distress. Ears/Nose/Mouth/Throat: Pupils equal and round, Hearing grossly normal.  Neck: Supple. JVP within normal limits. Carotids good upstrokes, with no bruit. Chest: Lungs clear to auscultation bilaterally. Cardiovascular: Irregular rate and rhythm, S1S2 normal, no murmur, rubs, gallops. Abdomen: Soft, non-tender, bowel sounds are active. No organomegaly. Extremities: No edema bilaterally. Femoral pulses +2, Distal Pulses +1. Skin: Warm and dry. Neuro: Alert , follows simple commands  Psychiatric: Cooperative,     []         Post-cath site without hematoma, bruit, tenderness, or thrill. Distal pulses intact. Data:      Telemetry:    EKG:  []  No new EKG for review. EKG A. fib with rapid ventricular response    Prior to Admission medications    Medication Sig Start Date End Date Taking? Authorizing Provider   metoprolol tartrate (LOPRESSOR) 25 mg tablet Take 1 Tablet by mouth two (2) times a day.  6/24/22  Yes Lavonne Frankel, MD   cyclobenzaprine (FLEXERIL) 10 mg tablet Take 0.5 Tablets by mouth three (3) times daily as needed for Muscle Spasm(s). 6/23/22  Yes Lavonne Frankel, MD   enoxaparin (LOVENOX) 40 mg/0.4 mL 0.4 mL by SubCUTAneous route daily. 6/23/22  Yes Enedina Rae MD   oxyCODONE IR (ROXICODONE) 5 mg immediate release tablet Take 1 Tablet by mouth every four (4) hours as needed for Pain for up to 3 days. Max Daily Amount: 30 mg. 6/23/22 6/26/22 Yes Enedina Rae MD   acetaminophen (TYLENOL) 500 mg tablet Take 500 mg by mouth every six (6) hours as needed for Pain.    Yes Provider, Historical       Recent Results (from the past 24 hour(s))   EKG, 12 LEAD, INITIAL    Collection Time: 06/23/22  1:48 PM   Result Value Ref Range    Ventricular Rate 107 BPM    Atrial Rate 120 BPM    QRS Duration 84 ms    Q-T Interval 320 ms    QTC Calculation (Bezet) 427 ms    Calculated R Axis 33 degrees    Calculated T Axis 18 degrees    Diagnosis       Atrial fibrillation with rapid ventricular response  Nonspecific T wave abnormality  Abnormal ECG  When compared with ECG of 13-OCT-2021 08:09,  No significant change was found  Confirmed by Jimbo Paul MD, [address] [place] [address] ([facility]) on 6/23/2022 4:25:30 PM          Estephanie White MD

## 2022-06-24 NOTE — PROGRESS NOTES
OCCUPATIONAL THERAPY EVALUATION  Patient: Gerda Rivas [de-identified]81 y.o. female)  Date: 6/24/2022  Primary Diagnosis: Lumbar compression fracture (HCC) [S32.000A]  Intractable low back pain [M54.59]  Compression fracture of body of thoracic vertebra (HCC) [S22.000A]        Precautions: fall risk, TLSO brace OOB, log rolling/spinal precautions       ASSESSMENT  Pt is an 82 y/o F with PMH of atrial fibrillation presenting to [facility] with c/o significant lower back pain s/p GLF from [facility] at home, admitted 6/20/22 and being treated for L1-L2 compression fx and s/p kyphoplasty with IR on 6/22/22. Per IR progress note 6/23, pt to use TLSO brace when tolerated. Pt received semi-supine in bed upon arrival, AXO x4, and agreeable to OT/PT evaluations at this time. Per pt report, pt lives with alone (spouse recently passed a few months ago and closest step son resides in [place]) in a one-story home with 4-5 LAURE and B HR, also has a ramp on the side of the home. Pt reports being IND with ADLs ambulatory without AD at [facility], however does own a SPC, RW, w/c, and shower chair. for mobility at [facility]. Based on current observations, pt presents with deficits in generalized strength/AROM, bed mobility, static/dynamic sitting balance, static/dynamic standing balance, functional activity tolerance, and pain impacting overall performance of ADLs and functional transfers/mobility. Pt currently requires total A to don michi socks in long sitting, max A for rolling and supine>sit via log rolling technique and additional time 2/2 c/o pain, and total A to don TLSO brace in sitting. Sit><stand transfer completed to/from EOB and toilet using gt belt, RW and min A and additional time; cueing required for hand placement/safety and encouragement for PLB as pt with noted difficulty tolerating OOB activity and moaning out in pain.  Pt returned to supine mod A x2, completed basic grooming (washing face and oral care) s/p setup with HOB elevated at end of session. Overall, pt tolerates session fair with c/o LBP (FACES 10/10 with activity, RN made aware; and SPO2 96%> on RA, HR 80s). Pt would benefit from continued skilled OT services to address current impairments and improve IND and safety with self cares and functional transfers/mobility. Current OT d/c recommendation SNF once medically appropriate. Other factors to consider for discharge: family/social support, DME, time since onset, severity of deficits, functional baseline     Patient will benefit from skilled therapy intervention to address the above noted impairments. PLAN :  Recommendations and Planned Interventions: self care training, functional mobility training, therapeutic exercise, balance training, therapeutic activities, endurance activities, neuromuscular re-education and patient education    Frequency/Duration: Patient will be followed by occupational therapy:  3-5x/week to address goals. Recommendation for discharge: (in order for the patient to meet his/her long term goals)  Justin Self    This discharge recommendation:  Has been made in collaboration with the attending provider and/or case management       SUBJECTIVE:   Patient stated My back is in so much pain.     OBJECTIVE DATA SUMMARY:   HISTORY:   Past Medical History:   Diagnosis Date    A-fib ([facility] [address]    No past surgical history on file.     Expanded or extensive additional review of patient history:     Home Situation  Home Environment: Private residence  # Steps to Enter: 5  Rails to Enter: Yes  Hand Rails : Bilateral  Wheelchair Ramp: Yes  One/Two Story Residence: One story  Living Alone: Yes  Support Systems: Child(jannet)  Patient Expects to be Discharged to[de-identified] Home with home health  Current DME Used/Available at Home: [employer], straight,Walker, rolling,Wheelchair,Shower chair    Hand dominance: Right    EXAMINATION OF PERFORMANCE DEFICITS:  Cognitive/Behavioral Status:  Neurologic State: Alert  Orientation Level: Oriented X4  Cognition: Follows commands               Hearing: Auditory  Auditory Impairment: None    Range of Motion:  AROM: Generally decreased, functional                         Strength:  Strength: Generally decreased, functional                Coordination:  Coordination: Within functional limits  Fine Motor Skills-Upper: Left Intact; Right Intact    Gross Motor Skills-Upper: Left Intact; Right Intact    Balance:  Sitting: Impaired; With support  Sitting - Static: Fair (occasional)  Sitting - Dynamic: Fair (occasional)  Standing: Impaired; With support  Standing - Static: Fair;Constant support  Standing - Dynamic : Fair;Constant support    Functional Mobility and Transfers for ADLs:  Bed Mobility:  Rolling: Maximum assistance; Additional time  Supine to Sit: Maximum assistance; Additional time  Sit to Supine: Moderate assistance;Assist x2  Scooting: Maximum assistance    Transfers:  Sit to Stand: Minimum assistance; Additional time  Stand to Sit: Minimum assistance; Additional time  Bathroom Mobility: Minimum assistance  Toilet Transfer : Minimum assistance; Additional time      ADL Intervention and task modifications:       Grooming  Grooming Assistance: Set-up; Stand-by assistance  Position Performed: Long sitting on bed  Washing Face: Set-up; Stand-by assistance  Brushing Teeth: Set-up; Stand-by assistance  Brushing/Combing Hair: Set-up; Stand-by assistance              Upper Body Dressing Assistance  Orthotics(Brace): Total assistance (dependent)    Lower Body Dressing Assistance  Socks: Total assistance (dependent)  Position Performed: Long sitting on bed              Therapeutic Exercise:  Pt would benefit from UE HEP to improve overall UE AROM/strength and can be further educated in next treatment session.      Functional Measure:    [facility] AM-PACTM \"6 Clicks\"                                                       Daily Activity Inpatient Short Form  How much help from another person does the patient currently need. .. Total; A Lot A Little None   1. Putting on and taking off regular lower body clothing? [x]  1 []  2 []  3 []  4   2. Bathing (including washing, rinsing, drying)? []  1 [x]  2 []  3 []  4   3. Toileting, which includes using toilet, bedpan or urinal? [x] 1 []  2 []  3 []  4   4. Putting on and taking off regular upper body clothing? []  1 [x]  2 []  3 []  4   5. Taking care of personal grooming such as brushing teeth? []  1 []  2 [x]  3 []  4   6. Eating meals? []  1 []  2 [x]  3 []  4   © 2007, Trustees of [address] [place] [address] [address] [address], under license to [employer]. All rights reserved     Score: 12/24     Interpretation of Tool:  Represents clinically-significant functional categories (i.e. Activities of daily living). Percentage of Impairment CH    0%   CI    1-19% CJ    20-39% CK    40-59% CL    60-79% CM    80-99% CN     100%   [facility]  Score 6-24 24 23 20-22 15-19 10-14 7-9 6         Occupational Therapy Evaluation Charge Determination   History Examination Decision-Making   LOW Complexity : Brief history review  MEDIUM Complexity : 3-5 performance deficits relating to physical, cognitive , or psychosocial skils that result in activity limitations and / or participation restrictions MEDIUM Complexity : Patient may present with comorbidities that affect occupational performnce. Miniml to moderate modification of tasks or assistance (eg, physical or verbal ) with assesment(s) is necessary to enable patient to complete evaluation       Based on the above components, the patient evaluation is determined to be of the following complexity level: LOW   Pain Rating:  FACES 10/10 back with OOB activity     Activity Tolerance:   Fair and requires rest breaks    After treatment patient left in no apparent distress:    Supine in bed, Call bell within reach and Side rails x 3    COMMUNICATION/EDUCATION:   The patients plan of care was discussed with: Physical therapist, Registered nurse and Case management. Patient/family have participated as able in goal setting and plan of care. and Patient/family agree to work toward stated goals and plan of care. This patients plan of care is appropriate for delegation to [facility]. OT/PT sessions occurred together for increased patient and clinician safety as pt with decreased activity tolerance at this time.      Thank you for this referral.  Donna Iraheta  Time Calculation: 45 mins   Problem: Self Care Deficits Care Plan (Adult)  Goal: *Acute Goals and Plan of Care (Insert Text)  Description: Pt stated goal \"to be in less pain\"  Pt will be Mod I sup <> sit in prep for EOB ADLs  Pt will be Mod I grooming standing sink side LRAD  Pt will be Mod I UB dressing sitting EOB/long sit   Pt will be Mod I LE dressing sitting EOB/long sit  Pt will be Mod I sit <>  prep for toileting LRAD  Pt will be Mod I toileting/toilet transfer/cloth mgmt LRAD  Pt will be IND following UE HEP in prep for self care tasks      Outcome: Not Met

## 2022-06-24 NOTE — DISCHARGE SUMMARY
Discharge Summary       PATIENT ID: Shireen Alatorre  MRN: [id number]   YOB: 1935    DATE OF ADMISSION: 6/20/2022  6:47 PM    DATE OF DISCHARGE:   PRIMARY CARE PROVIDER: None     ATTENDING PHYSICIAN: Kota Rae  DISCHARGING PROVIDER: Kota Rae      CONSULTATIONS: IP CONSULT TO HOSPITALIST  IP CONSULT TO ORTHOPEDIC SURGERY  IP CONSULT TO INTERVENTIONAL RADIOLOGY  IP CONSULT TO CARDIOLOGY    PROCEDURES/SURGERIES: * No surgery found *    ADMITTING DIAGNOSES:    Patient Active Problem List    Diagnosis Date Noted    Compression fracture of body of thoracic vertebra ([facility] [address] 06/21/2022    Lumbar compression fracture ([facility] [address] 06/20/2022    Intractable low back pain 06/20/2022    UTI (urinary tract infection) 10/13/2021    Altered mental status 10/13/2021    Systemic adverse effect of COVID-19 vaccine 10/13/2021    Sepsis ([facility] [address] 10/13/2021    Infectious encephalopathy 10/13/2021       DISCHARGE DIAGNOSES / PLAN:      L2 compression fracture  History of A. Fib  L1 and L2 Kyphoplasty and L1 biopsy        DISCHARGE MEDICATIONS:  Current Discharge Medication List      START taking these medications    Details   cyclobenzaprine (FLEXERIL) 10 mg tablet Take 0.5 Tablets by mouth three (3) times daily as needed for Muscle Spasm(s). Qty: 30 Tablet, Refills: 0  Start date: 6/23/2022      enoxaparin (LOVENOX) 40 mg/0.4 mL 0.4 mL by SubCUTAneous route daily. Qty: 30 Each, Refills: 0  Start date: 6/23/2022      oxyCODONE IR (ROXICODONE) 5 mg immediate release tablet Take 1 Tablet by mouth every four (4) hours as needed for Pain for up to 3 days.  Max Daily Amount: 30 mg.  Qty: 15 Tablet, Refills: 0  Start date: 6/23/2022, End date: 6/26/2022    Associated Diagnoses: Compression fracture of L1 vertebra, initial encounter ([facility] [address]         STOP taking these medications       acetaminophen (TYLENOL) 500 mg tablet Comments:   Reason for Stopping:                 NOTIFY YOUR PHYSICIAN FOR ANY OF THE FOLLOWING:   Fever over 101 degrees for 24 hours. Chest pain, shortness of breath, fever, chills, nausea, vomiting, diarrhea, change in mentation, falling, weakness, bleeding. Severe pain or pain not relieved by medications. Or, any other signs or symptoms that you may have questions about. DISPOSITION:  x  Home With:   OT  PT  HH  RN       Long term SNF/Inpatient Rehab    Independent/assisted living    Hospice    Other:       PATIENT CONDITION AT DISCHARGE: Stable      PHYSICAL EXAMINATION AT DISCHARGE:  General:          Alert, cooperative, no distress, appears stated age. HEENT:           Atraumatic, anicteric sclerae, pink conjunctivae                          No oral ulcers, mucosa moist, throat clear, dentition fair  Neck:               Supple, symmetrical  Lungs:             Clear to auscultation bilaterally. No Wheezing or Rhonchi. No rales. Chest wall:      No tenderness  No Accessory muscle use. Heart:              Regular  rhythm,  No  murmur   No edema  Abdomen:        Soft, non-tender. Not distended. Bowel sounds normal  Extremities:     No cyanosis. No clubbing,                            Skin turgor normal, Capillary refill normal  Skin:                Not pale. Not Jaundiced  No rashes   Psych:             Not anxious or agitated. Neurologic:      Alert, moves all extremities, answers questions appropriately and responds to commands     MRI LUMB SPINE WO CONT   Final Result   1. Compression fractures of L1 and L2 as above. Heterogeneous signal within the   L1 vertebral body, which may represent an underlying lesion, such as a   hemangioma or other. Correlate clinically for possibility of malignancy. 2.  Abnormal ventral epidural signal at the level of T12, which may represent   superior extension of ventral epidural hemorrhage secondary to L1 vertebral body   fracture. Alternatively, findings may represent a caudally migrating disc   extrusion.    3.  Associated posterior paraspinal and anterior paravertebral edema with edema   extending down the left psoas. 4.  Multilevel degenerative changes of the lumbar spine as detailed by level   above. CT SPINE LUMB WO CONT   Final Result   1. Severe L2 compression fracture with approximate 75% loss of height. 2. Mild L1 compression fracture with less than 10% loss of height. 3. Multilevel degenerative changes in the lumbar spine with spinal canal   stenosis and neural foraminal narrowing. 4. Please see above report for further details. CT PELV WO CONT   Final Result   1. 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 2. There is a 13 mm left adnexal cystic lesion. This could be further evaluated   with pelvic sonogram on a nonemergent basis. CT SPINE CERV WO CONT   Final Result   1. Height loss of the C7 vertebral body, which may represent an   age-indeterminate compression fracture. This could be further evaluated with MRI   to determine if there is any bone marrow edema at this location. 2. Degenerative changes in the cervical spine. There is mild anterolisthesis of   C4 on C5, which is favored to be related to facet osteoarthritis.       IR KYPHOPLASTY LUMBAR    (Results Pending)   XR FLUOROSCOPY UNDER 60 MINUTES    (Results Pending)   IR VERTEBROPLASTY FLUOROSCOPIC    (Results Pending)        Recent Results (from the past 24 hour(s))   EKG, 12 LEAD, INITIAL    Collection Time: 06/23/22  1:48 PM   Result Value Ref Range    Ventricular Rate 107 BPM    Atrial Rate 120 BPM    QRS Duration 84 ms    Q-T Interval 320 ms    QTC Calculation (Bezet) 427 ms    Calculated R Axis 33 degrees    Calculated T Axis 18 degrees    Diagnosis       Atrial fibrillation with rapid ventricular response  Nonspecific T wave abnormality  Abnormal ECG  When compared with ECG of 13-OCT-2021 08:09,  No significant change was found  Confirmed by Debora OSPINA, [address] [place] [address] [id number]) on 6/23/2022 4:25:30 PM            [facility] COURSE:    Patient is Hoda.Neemaise y.o. year old female history of A. fib not on any medications not on any anticoagulation came to ER after have a fall while using bedside commode  Complaining of severe back pain     Came to emergency room seen by ER physician     CT      IMPRESSION  1. 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 2. There is a 13 mm left adnexal cystic lesion. This could be further evaluated  with pelvic sonogram on a nonemergent basis.        1. Severe L2 compression fracture with approximate 75% loss of height. 2. Mild L1 compression fracture with less than 10% loss of height. 3. Multilevel degenerative changes in the lumbar spine with spinal canal  stenosis and neural foraminal narrowing.   4. Please see above report for further details      Patient seen by the orthopedics and also seen with interventional radiology patient had   L1 and L2 Kyphoplasty and L1 biopsy    Patient tolerated the procedure well patient alert awake denies any chest pain shortness of breath nausea vomiting      Patient discharged to skilled care rehab    Medication reconciliation done time discharge patient 35 minutes 50% time spent counseling and coordination of care    6/24  Patient have a history of A. fib yesterday have episode of A. fib with rapid ventricular rate started on beta-blocker and cardiology was consulted  Today patient is resting the bed not in distress no acute complaints          Signed:   Raffy Thornton MD  6/24/2022  8:50 AM

## 2022-06-24 NOTE — PROGRESS NOTES
Around @315am, patient attempted to go to restroom and thought that she was at her house, worsening the pain to 10/10. This RN redirected patient. AOx4. VSS. Pain med given. Patient is resting comfortably now.

## 2022-06-24 NOTE — PROGRESS NOTES
Problem: Falls - Risk of  Goal: *Absence of Falls  Description: Document Raven Palma Fall Risk and appropriate interventions in the flowsheet.   Outcome: Progressing Towards Goal  Note: Fall Risk Interventions:  Mobility Interventions: Bed/chair exit alarm    Mentation Interventions: Bed/chair exit alarm    Medication Interventions: Bed/chair exit alarm,Patient to call before getting OOB    Elimination Interventions: Bed/chair exit alarm    History of Falls Interventions: Bed/chair exit alarm

## 2022-06-24 NOTE — PROGRESS NOTES
Problem: Patient Education: Go to Patient Education Activity  Goal: Patient/Family Education  Outcome: Progressing Towards Goal     Problem: Pain  Goal: *Control of Pain  Outcome: Progressing Towards Goal  Goal: *PALLIATIVE CARE:  Alleviation of Pain  Outcome: Progressing Towards Goal     Problem: Patient Education: Go to Patient Education Activity  Goal: Patient/Family Education  Outcome: Progressing Towards Goal     Problem: Pressure Injury - Risk of  Goal: *Prevention of pressure injury  Description: Document Deon Scale and appropriate interventions in the flowsheet.   Outcome: Progressing Towards Goal  Note: Pressure Injury Interventions:       Moisture Interventions: Absorbent underpads,Offer toileting Q_hr,Internal/External urinary devices    Activity Interventions: PT/OT evaluation    Mobility Interventions: HOB 30 degrees or less    Nutrition Interventions: Document food/fluid/supplement intake                     Problem: Patient Education: Go to Patient Education Activity  Goal: Patient/Family Education  Outcome: Progressing Towards Goal

## 2022-06-25 LAB
ECHO AO ASC DIAM: 3.7 CM
ECHO AO ASCENDING AORTA INDEX: 2.08 CM/M2
ECHO AO DESC DIAM: 2.2 CM
ECHO AO DESCENDING AORTA INDEX: 1.24 CM/M2
ECHO AO ROOT DIAM: 3 CM
ECHO AO ROOT INDEX: 1.69 CM/M2
ECHO AV AREA PEAK VELOCITY: 2 CM2
ECHO AV AREA VTI: 1.9 CM2
ECHO AV AREA/BSA PEAK VELOCITY: 1.1 CM2/M2
ECHO AV AREA/BSA VTI: 1.1 CM2/M2
ECHO AV MEAN GRADIENT: 5 MMHG
ECHO AV MEAN VELOCITY: 1 M/S
ECHO AV PEAK GRADIENT: 8 MMHG
ECHO AV PEAK VELOCITY: 1.4 M/S
ECHO AV VELOCITY RATIO: 0.57
ECHO AV VTI: 31.1 CM
ECHO EST RA PRESSURE: 8 MMHG
ECHO IVC PROX: 1.7 CM
ECHO LA AREA 4C: 28 CM2
ECHO LA DIAMETER INDEX: 2.58 CM/M2
ECHO LA DIAMETER: 4.6 CM
ECHO LA MAJOR AXIS: 7.5 CM
ECHO LA TO AORTIC ROOT RATIO: 1.53
ECHO LV E' LATERAL VELOCITY: 6 CM/S
ECHO LV E' SEPTAL VELOCITY: 7 CM/S
ECHO LV EDV A4C: 68 ML
ECHO LV EDV INDEX A4C: 38 ML/M2
ECHO LV EJECTION FRACTION A4C: 59 %
ECHO LV EJECTION FRACTION BIPLANE: 58 % (ref 55–100)
ECHO LV ESV A4C: 28 ML
ECHO LV ESV INDEX A4C: 16 ML/M2
ECHO LV FRACTIONAL SHORTENING: 31 % (ref 28–44)
ECHO LV INTERNAL DIMENSION DIASTOLE INDEX: 2.92 CM/M2
ECHO LV INTERNAL DIMENSION DIASTOLIC: 5.2 CM (ref 3.9–5.3)
ECHO LV INTERNAL DIMENSION SYSTOLIC INDEX: 2.02 CM/M2
ECHO LV INTERNAL DIMENSION SYSTOLIC: 3.6 CM
ECHO LV IVSD: 1.1 CM (ref 0.6–0.9)
ECHO LV MASS 2D: 220.8 G (ref 67–162)
ECHO LV MASS INDEX 2D: 124 G/M2 (ref 43–95)
ECHO LV POSTERIOR WALL DIASTOLIC: 1.1 CM (ref 0.6–0.9)
ECHO LV RELATIVE WALL THICKNESS RATIO: 0.42
ECHO LVOT AREA: 3.5 CM2
ECHO LVOT AV VTI INDEX: 0.55
ECHO LVOT DIAM: 2.1 CM
ECHO LVOT MEAN GRADIENT: 1 MMHG
ECHO LVOT PEAK GRADIENT: 3 MMHG
ECHO LVOT PEAK VELOCITY: 0.8 M/S
ECHO LVOT STROKE VOLUME INDEX: 33.3 ML/M2
ECHO LVOT SV: 59.2 ML
ECHO LVOT VTI: 17.1 CM
ECHO MV A VELOCITY: 0.28 M/S
ECHO MV AREA VTI: 1.9 CM2
ECHO MV E DECELERATION TIME (DT): 144 MS
ECHO MV E VELOCITY: 1.17 M/S
ECHO MV E/A RATIO: 4.18
ECHO MV E/E' LATERAL: 19.5
ECHO MV E/E' RATIO (AVERAGED): 18.11
ECHO MV E/E' SEPTAL: 16.71
ECHO MV LVOT VTI INDEX: 1.8
ECHO MV MAX VELOCITY: 1.8 M/S
ECHO MV MEAN GRADIENT: 4 MMHG
ECHO MV MEAN VELOCITY: 0.8 M/S
ECHO MV PEAK GRADIENT: 13 MMHG
ECHO MV REGURGITANT PEAK GRADIENT: 58 MMHG
ECHO MV REGURGITANT PEAK VELOCITY: 3.8 M/S
ECHO MV VTI: 30.7 CM
ECHO RIGHT VENTRICULAR SYSTOLIC PRESSURE (RVSP): 35 MMHG
ECHO RV TAPSE: 1.8 CM (ref 1.7–?)
ECHO TV REGURGITANT MAX VELOCITY: 2.59 M/S
ECHO TV REGURGITANT PEAK GRADIENT: 27 MMHG

## 2022-06-25 PROCEDURE — 97530 THERAPEUTIC ACTIVITIES: CPT

## 2022-06-25 PROCEDURE — 65270000029 HC RM PRIVATE

## 2022-06-25 PROCEDURE — 74011250637 HC RX REV CODE- 250/637: Performed by: PHYSICIAN ASSISTANT

## 2022-06-25 PROCEDURE — 74011250637 HC RX REV CODE- 250/637: Performed by: INTERNAL MEDICINE

## 2022-06-25 PROCEDURE — 74011250637 HC RX REV CODE- 250/637: Performed by: FAMILY MEDICINE

## 2022-06-25 RX ADMIN — APIXABAN 5 MG: 5 TABLET, FILM COATED ORAL at 09:14

## 2022-06-25 RX ADMIN — OXYCODONE HYDROCHLORIDE 10 MG: 10 TABLET ORAL at 13:18

## 2022-06-25 RX ADMIN — APIXABAN 5 MG: 5 TABLET, FILM COATED ORAL at 20:41

## 2022-06-25 RX ADMIN — METOPROLOL TARTRATE 25 MG: 25 TABLET, FILM COATED ORAL at 20:41

## 2022-06-25 RX ADMIN — METOPROLOL TARTRATE 25 MG: 25 TABLET, FILM COATED ORAL at 09:14

## 2022-06-25 NOTE — DISCHARGE SUMMARY
Discharge Summary     Patient: Kathie Bush MRN: [id number]  SSN: [id number]    YOB: 1935  Age: 81 y.o. Sex: female       Admit Date: 6/20/2022    Discharge Date: 6/25/2022      Admission Diagnoses: Lumbar compression fracture ([facility] [S32.000A]  Intractable low back pain [M54.59]  Compression fracture of body of thoracic vertebra ([facility] [S22.000A]    Discharge Diagnoses:   Problem List as of 6/25/2022 Date Reviewed: 10/13/2021          Codes Class Noted - Resolved    Compression fracture of body of thoracic vertebra ([facility] ICD-10-CM: S22.000A  ICD-9-CM: 805.2  6/21/2022 - Present        Lumbar compression fracture ([facility] ICD-10-CM: S32.000A  ICD-9-CM: 805.4  6/20/2022 - Present        Intractable low back pain ICD-10-CM: M54.59  ICD-9-CM: 724.2  6/20/2022 - Present        UTI (urinary tract infection) ICD-10-CM: N39.0  ICD-9-CM: 599.0  10/13/2021 - Present        Altered mental status ICD-10-CM: R41.82  ICD-9-CM: 780.97  10/13/2021 - Present        Systemic adverse effect of COVID-19 vaccine ICD-10-CM: T50. B95A  ICD-9-CM: E949.6  10/13/2021 - Present        Sepsis ([facility] ICD-10-CM: A41.9  ICD-9-CM: 038.9, 995.91  10/13/2021 - Present        Infectious encephalopathy ICD-10-CM: G93.49, B99.9  ICD-9-CM: 348.39, 136.9  10/13/2021 - Present               Discharge Condition: Good    Hospital Course: 81year-old patient with atrial fibrillation  With a complaint of severe back pain patient had a CT which showed bilateral femoral head osteonecrosis and also showed severe L2 compression fracture with spinal canal stenosis and neuroforaminal narrowing seen by interventional radiology and had an kyphoplasty done at L1 and L2 with recommendation for rehab follow-up with interventional radiology in 1 month    Consults: Interventional radiology    Significant Diagnostic Studies: labs: No results found for this or any previous visit (from the past 24 hour(s)). IR KYPHOPLASTY LUMBAR   Final Result   1. Successful kyphoplasty and bone biopsy of L1.   2.  Successful kyphoplasty of L2. A fracture has occurred and the patient should be considered for osteoporosis   treatment or testing. IR VERTEBROPLASTY FLUOROSCOPIC   Final Result   1. Successful kyphoplasty and bone biopsy of L1.   2.  Successful kyphoplasty of L2. A fracture has occurred and the patient should be considered for osteoporosis   treatment or testing. XR FLUOROSCOPY UNDER 60 MINUTES   Final Result   1. Successful kyphoplasty and bone biopsy of L1.   2.  Successful kyphoplasty of L2. A fracture has occurred and the patient should be considered for osteoporosis   treatment or testing. MRI LUMB SPINE WO CONT   Final Result   1. Compression fractures of L1 and L2 as above. Heterogeneous signal within the   L1 vertebral body, which may represent an underlying lesion, such as a   hemangioma or other. Correlate clinically for possibility of malignancy. 2.  Abnormal ventral epidural signal at the level of T12, which may represent   superior extension of ventral epidural hemorrhage secondary to L1 vertebral body   fracture. Alternatively, findings may represent a caudally migrating disc   extrusion. 3.  Associated posterior paraspinal and anterior paravertebral edema with edema   extending down the left psoas. 4.  Multilevel degenerative changes of the lumbar spine as detailed by level   above. CT SPINE LUMB WO CONT   Final Result   1. Severe L2 compression fracture with approximate 75% loss of height. 2. Mild L1 compression fracture with less than 10% loss of height. 3. Multilevel degenerative changes in the lumbar spine with spinal canal   stenosis and neural foraminal narrowing. 4. Please see above report for further details. CT PELV WO CONT   Final Result   1. 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    2. There is a 13 mm left adnexal cystic lesion. This could be further evaluated   with pelvic sonogram on a nonemergent basis. CT SPINE CERV WO CONT   Final Result   1. Height loss of the C7 vertebral body, which may represent an   age-indeterminate compression fracture. This could be further evaluated with MRI   to determine if there is any bone marrow edema at this location. 2. Degenerative changes in the cervical spine. There is mild anterolisthesis of   C4 on C5, which is favored to be related to facet osteoarthritis. Disposition: SNF    Discharge Medications:   Current Discharge Medication List      START taking these medications    Details   apixaban (ELIQUIS) 5 mg tablet Take 1 Tablet by mouth two (2) times a day. Qty: 60 Tablet, Refills: 0      metoprolol tartrate (LOPRESSOR) 25 mg tablet Take 1 Tablet by mouth two (2) times a day. Qty: 60 Tablet, Refills: 0      cyclobenzaprine (FLEXERIL) 10 mg tablet Take 0.5 Tablets by mouth three (3) times daily as needed for Muscle Spasm(s). Qty: 30 Tablet, Refills: 0      enoxaparin (LOVENOX) 40 mg/0.4 mL 0.4 mL by SubCUTAneous route daily. Qty: 30 Each, Refills: 0      oxyCODONE IR (ROXICODONE) 5 mg immediate release tablet Take 1 Tablet by mouth every four (4) hours as needed for Pain for up to 3 days.  Max Daily Amount: 30 mg.  Qty: 15 Tablet, Refills: 0    Associated Diagnoses: Compression fracture of L1 vertebra, initial encounter ([facility]         STOP taking these medications       acetaminophen (TYLENOL) 500 mg tablet Comments:   Reason for Stopping:               Activity: PT/OT Eval and Treat  Diet: Resume previous diet  Wound Care: Routine catheter care and As directed    Follow-up Appointments   Procedures    FOLLOW UP VISIT Appointment in: 3 - 5 Days     Standing Status:   Standing     Number of Occurrences:   1     Order Specific Question:   Appointment in     Answer:   3 - 5 Days     40 minutes discharge time  Signed By: Jackie Teran MD     June 25, 2022

## 2022-06-25 NOTE — PROGRESS NOTES
0130  CM reviewed clinical record. Patient has a dc order. Referral has been sent to [facility] yesterday. Writer reached out to facility via [facility] to f/u on acceptance. Awaiting a response.      [address], [address]

## 2022-06-25 NOTE — PROGRESS NOTES
Problem: Mobility Impaired (Adult and Pediatric)  Goal: *Acute Goals and Plan of Care (Insert Text)  Description: Patient stated goal:get rid of the pain  Patient will move from supine to sit and sit to supine , scoot up and down, and roll side to side in bed with modified independence within 7 day(s). Patient will transfer from bed to chair and chair to bed with modified independence using the least restrictive device within 7 day(s). Patient will improve static standing balance to modified independence within 1 week(s). Patient will ambulate 50 feet with minimal assistance with least restrictive device within 1 weeks. Outcome: Progressing Towards Goal   PHYSICAL THERAPY TREATMENT  Patient: Avni Santillan [de-identified]81 y.o. female)  Date: 6/25/2022  Diagnosis: Lumbar compression fracture ([facility]) [S32.000A]  Intractable low back pain [M54.59]  Compression fracture of body of thoracic vertebra ([address] [S22.000A] <principal problem not specified>       Precautions:    Chart, physical therapy assessment, plan of care and goals were reviewed. ASSESSMENT  Patient continues with skilled PT services and is progressing towards goals. Co-treat with OT due to level of assist needed for OOB mobility and decr tolerance to physical activity 2/2 high pain levels. Upon entry into room, patient semi-supine in bed and agreeable to work with PT. Bed mobility performed with modA x 2 to come to sitting EOB, incr time and cues needed for sequencing as well as log rolling technique. Patient sat EOB while therapists assisted in donning TLSO, which patient was hollering out in pain during. Sit<>stand from bed with Jaret x 2 and use of RW. Patient ambulated 10 feet into restroom with RW and CGA and performed toilet transfer. No assistance needed with pericare but modA to stand from toilet.  Patient then ambulated to chair at bedside and attempted to perform LE therex but after 10 reps of LAQ on LLE, patient said she was in too much pain and needed to lay down. Performed SPT to bed with Jaret to stand with RW and patient was impulsive to lay down so she sort of fell back on the bed with her TLSO on and despite therapist's cues to sit on EOB first so brace could be doffed. ModA to bring patient into long sitting so that TLSO could be removed. MaxA x 1 to scoot to [facility] and position to comfort. She will benefit from continued skilled PT Services to improve her strength for bed mobility and transfers and decrease assistance from caregivers. Assist x 2 required for level of assist for transfers for safety of patient/clinician. Current Level of Function Impacting Discharge (mobility/balance): decr strength, decr mobility, decr endurance     Other factors to consider for discharge: high pain levels, level of assist, lumbar fracture          PLAN :  Patient continues to benefit from skilled intervention to address the above impairments. Continue treatment per established plan of care. to address goals. Recommendation for discharge: (in order for the patient to meet his/her long term goals)  Justin Self    This discharge recommendation:  Has been made in collaboration with the attending provider and/or case management    IF patient discharges home will need the following DME: rolling walker and to be determined (TBD)       SUBJECTIVE:   Patient stated my pain is so high all the time. It hurts any way I move.     OBJECTIVE DATA SUMMARY:   Critical Behavior:  Neurologic State: Alert  Orientation Level: Oriented X4  Cognition: Decreased command following,Poor safety awareness     Functional Mobility Training:  Bed Mobility:  Rolling: Moderate assistance;Assist x1  Supine to Sit: Moderate assistance;Assist x2  Sit to Supine: Maximum assistance;Assist x2  Scooting:  Moderate assistance;Assist x1        Transfers:  Sit to Stand: Minimum assistance;Assist x2  Stand to Sit: Minimum assistance;Assist x2        Bed to Chair: Minimum assistance;Assist x1    Balance:  Sitting: High guard; Intact  Standing: Impaired; With support  Standing - Static: Fair  Standing - Dynamic : Poor  Ambulation/Gait Training:     Patient ambulated into restroom with RW and CGA to perform toilet transfer. Forward flexed posture in standing. Therapeutic Exercises:   Patient performed 10x LAQ LLE only and declined any further exercises due to pain    Pain Ratin-10/10 at rest and with movement     Activity Tolerance:   Poor, SpO2 stable on RA, requires frequent rest breaks, and observed SOB with activity  Please refer to the flowsheet for vital signs taken during this treatment. After treatment patient left in no apparent distress:   Supine in bed, Call bell within reach, and Side rails x 3    COMMUNICATION/COLLABORATION:   The patients plan of care was discussed with: Physical therapist, Occupational therapy assistant, and Registered nurse.      Mila Campos   Time Calculation: 26 mins

## 2022-06-25 NOTE — PROGRESS NOTES
Progress Note      6/25/2022 1:32 PM  NAME: Candelario Gonzales   MRN:  [id number]   Admit Diagnosis: Lumbar compression fracture ([facility] [S32.000A]  Intractable low back pain [M54.59]  Compression fracture of body of thoracic vertebra ([facility] [S22.000A]        Assessment/Plan:     1. Atrial fibrillation with rapid ventricular response. Patient's heart rate currently in the 80s. Continue current regimen of beta-blocker with anticoagulation by Eliquis.     2. Anemia, hemoglobin yesterday 8.2. Keep a close watch on hemoglobin while patient on Eliquis. 3. Back pain, compression fracture, status post kyphoplasty         []       High complexity decision making was performed in this patient at high risk for decompensation with multiple organ involvement. Subjective:     Candelario Gonzales denies chest pain, dyspnea. Complains of back pain. Discussed with RN events overnight. Review of Systems:    Review of Systems   Constitutional: Negative for chills, fever and weight loss. HENT: Negative for congestion and sore throat. Eyes: Negative for blurred vision and double vision. Respiratory: Negative for cough, shortness of breath and wheezing. Cardiovascular: Negative for claudication and PND. Gastrointestinal: Negative for abdominal pain, blood in stool, nausea and vomiting. Genitourinary: Negative for dysuria and hematuria. Musculoskeletal: Positive for back pain. Negative for joint pain and myalgias. Skin: Negative for itching and rash. Neurological: Negative for dizziness, sensory change, focal weakness, loss of consciousness and headaches. Endo/Heme/Allergies: Negative for polydipsia. Does not bruise/bleed easily. Psychiatric/Behavioral: Negative for depression and hallucinations. Objective:      Physical Exam:    Last 24hrs VS reviewed since prior progress note.  Most recent are:    Visit Vitals  /69 (BP 1 Location: Left upper arm, BP Patient Position: At rest)   Pulse 91 Temp 98.3 °F (36.8 °C)   Resp 16   Ht 5' 4\" (1.626 m)   Wt 72.6 kg (160 lb)   SpO2 99%   BMI 27.46 kg/m²       Intake/Output Summary (Last 24 hours) at 6/25/2022 1332  Last data filed at 6/25/2022 7155  Gross per 24 hour   Intake 2016.25 ml   Output 1050 ml   Net 966.25 ml        Physical Exam:  Constitutional: Well developed well-nourished patient who appeared in no acute distress  HEENT: Normocephalic and atraumatic. Extraocular movement intact. Pupil reactive to light bilaterally  Neck: Supple without thyromegaly  Lungs: Scattered rhonchi diffusely  Heart: Irregularly irregular, normal S1-S2.  Jugular venous distention absent. Carotid bruits absent. PMI in fifth intercostal space on left midclavicular line. Extremities  Trace edema bilaterally  Abdomen: Soft nontender nondistended hypoactive bowel sounds  Skin: Dry and warm    []         Post-cath site without hematoma, bruit, tenderness, or thrill. Distal pulses intact. PMH/SH reviewed - no change compared to H&P    Data Review    Telemetry: Atrial fibrillation with rate control        IR KYPHOPLASTY LUMBAR   Final Result   1. Successful kyphoplasty and bone biopsy of L1.   2.  Successful kyphoplasty of L2. A fracture has occurred and the patient should be considered for osteoporosis   treatment or testing. IR VERTEBROPLASTY FLUOROSCOPIC   Final Result   1. Successful kyphoplasty and bone biopsy of L1.   2.  Successful kyphoplasty of L2. A fracture has occurred and the patient should be considered for osteoporosis   treatment or testing. XR FLUOROSCOPY UNDER 60 MINUTES   Final Result   1. Successful kyphoplasty and bone biopsy of L1.   2.  Successful kyphoplasty of L2. A fracture has occurred and the patient should be considered for osteoporosis   treatment or testing. MRI LUMB SPINE WO CONT   Final Result   1. Compression fractures of L1 and L2 as above.  Heterogeneous signal within the   L1 vertebral body, which may represent an underlying lesion, such as a   hemangioma or other. Correlate clinically for possibility of malignancy. 2.  Abnormal ventral epidural signal at the level of T12, which may represent   superior extension of ventral epidural hemorrhage secondary to L1 vertebral body   fracture. Alternatively, findings may represent a caudally migrating disc   extrusion. 3.  Associated posterior paraspinal and anterior paravertebral edema with edema   extending down the left psoas. 4.  Multilevel degenerative changes of the lumbar spine as detailed by level   above. CT SPINE LUMB WO CONT   Final Result   1. Severe L2 compression fracture with approximate 75% loss of height. 2. Mild L1 compression fracture with less than 10% loss of height. 3. Multilevel degenerative changes in the lumbar spine with spinal canal   stenosis and neural foraminal narrowing. 4. Please see above report for further details. CT PELV WO CONT   Final Result   1. 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 2. There is a 13 mm left adnexal cystic lesion. This could be further evaluated   with pelvic sonogram on a nonemergent basis. CT SPINE CERV WO CONT   Final Result   1. Height loss of the C7 vertebral body, which may represent an   age-indeterminate compression fracture. This could be further evaluated with MRI   to determine if there is any bone marrow edema at this location. 2. Degenerative changes in the cervical spine. There is mild anterolisthesis of   C4 on C5, which is favored to be related to facet osteoarthritis. No results found for this or any previous visit (from the past 24 hour(s)). Echo:   06/20/22    ECHO ADULT COMPLETE 06/25/2022 6/25/2022    Interpretation Summary    Left Ventricle: Normal left ventricular systolic function with a visually estimated EF of 55 - 60%. Left ventricle size is normal. Normal wall thickness.  Findings consistent with concentric remodeling. Normal wall motion. Abnormal diastolic function. Elevated left ventricular filling pressure.   Aortic Valve: Valve structure is normal. Mildly calcified cusp.   Mitral Valve: Mild regurgitation.   Left Atrium: Left atrium is mildly dilated.   Right Atrium: Right atrium is mildly dilated. Signed by: Al Love MD on 6/25/2022  9:57 AM      Patient's  EKG, laboratory data and echocardiogram were individually reviewed by me. Lab Data:    Recent Labs     06/23/22 0458   WBC 6.4   HGB 8.2*   HCT 25.1*   *     No results for input(s): INR, PTP, APTT, INREXT in the last 72 hours. Recent Labs     06/23/22 0458      K 3.7   *   CO2 23   BUN 14   CREA 0.42*   GLU 86   CA 7.4*     No results for input(s): CPK, CKNDX, TROIQ in the last 72 hours. No lab exists for component: CPKMB  No results found for: CHOL, CHOLX, CHLST, CHOLV, HDL, HDLP, LDL, LDLC, DLDLP, TGLX, TRIGL, TRIGP, CHHD, CHHDX    Recent Labs     06/23/22 0458   AP 85   TP 5.7*   ALB 2.2*   GLOB 3.5     No results for input(s): PH, PCO2, PO2 in the last 72 hours.     Medications Personally Reviewed:    Current Facility-Administered Medications   Medication Dose Route Frequency    apixaban (ELIQUIS) tablet 5 mg  5 mg Oral BID    metoprolol tartrate (LOPRESSOR) tablet 25 mg  25 mg Oral BID    midazolam (VERSED) injection 0.5-2 mg  0.5-2 mg IntraVENous Rad Multiple    fentaNYL citrate (PF) injection 12.5-50 mcg  12.5-50 mcg IntraVENous Multiple    ketorolac (TORADOL) injection 15 mg  15 mg IntraVENous PRN    acetaminophen (TYLENOL) tablet 650 mg  650 mg Oral Q6H PRN    Or    acetaminophen (TYLENOL) suppository 650 mg  650 mg Rectal Q6H PRN    polyethylene glycol (MIRALAX) packet 17 g  17 g Oral DAILY PRN    ondansetron (ZOFRAN ODT) tablet 4 mg  4 mg Oral Q8H PRN    Or    ondansetron (ZOFRAN) injection 4 mg  4 mg IntraVENous Q6H PRN    oxyCODONE IR (ROXICODONE) tablet 5 mg  5 mg Oral Q4H PRN    oxyCODONE IR (ROXICODONE) tablet 10 mg  10 mg Oral Q4H PRN    cyclobenzaprine (FLEXERIL) tablet 5 mg  5 mg Oral TID PRN         Prior to Admission medications    Medication Sig Start Date End Date Taking? Authorizing Provider   apixaban (ELIQUIS) 5 mg tablet Take 1 Tablet by mouth two (2) times a day. 6/25/22  Yes Shelli Cash MD   metoprolol tartrate (LOPRESSOR) 25 mg tablet Take 1 Tablet by mouth two (2) times a day. 6/24/22  Yes Bella Rae MD   cyclobenzaprine (FLEXERIL) 10 mg tablet Take 0.5 Tablets by mouth three (3) times daily as needed for Muscle Spasm(s). 6/23/22  Yes Won Edwards MD   enoxaparin (LOVENOX) 40 mg/0.4 mL 0.4 mL by SubCUTAneous route daily. 6/23/22  Yes Bella Rae MD   oxyCODONE IR (ROXICODONE) 5 mg immediate release tablet Take 1 Tablet by mouth every four (4) hours as needed for Pain for up to 3 days. Max Daily Amount: 30 mg. 6/23/22 6/26/22 Yes Bella Rae MD   acetaminophen (TYLENOL) 500 mg tablet Take 500 mg by mouth every six (6) hours as needed for Pain.    Yes Provider, Casi Hemphill MD

## 2022-06-25 NOTE — PROGRESS NOTES
Problem: Self Care Deficits Care Plan (Adult)  Goal: *Acute Goals and Plan of Care (Insert Text)  Description: Pt stated goal \"to be in less pain\"  Pt will be Mod I sup <> sit in prep for EOB ADLs  Pt will be Mod I grooming standing sink side LRAD  Pt will be Mod I UB dressing sitting EOB/long sit   Pt will be Mod I LE dressing sitting EOB/long sit  Pt will be Mod I sit <>  prep for toileting LRAD  Pt will be Mod I toileting/toilet transfer/cloth mgmt LRAD  Pt will be IND following UE HEP in prep for self care tasks      Outcome: Progressing Towards Goal     Problem: Patient Education: Go to Patient Education Activity  Goal: Patient/Family Education  Outcome: Progressing Towards Goal   OCCUPATIONAL THERAPY TREATMENT  Patient: Keyonna August (81 y.o. female)  Date: 6/25/2022  Diagnosis: Lumbar compression fracture (HCC) [S32.000A]  Intractable low back pain [M54.59]  Compression fracture of body of thoracic vertebra ([address] [S22.000A] <principal problem not specified>       Precautions:    Chart, occupational therapy assessment, plan of care, and goals were reviewed. ASSESSMENT  Patient continues with skilled OT services and is progressing towards goals. Patient received supine in bed agreeable to working with therapy services. Patient trained on log rolling technique with pt requiring mod Ax2 to sit up on EOB. Once seated, pt began hollering in pain while brace was applied total A. Pt completed STS from EOB with min Ax2 and ambulated to commode min Ax2 with RW. Pt able to perform bladder hygiene with SBA seated. Nurse entered pt room to administer pain medicine. Pt then ambulated into room min Ax1 using RW to bedside chair. While seated in bedside chair pt began LE LAQ only completing 3 before stating she could do no more. When inquiring on back precautions to pt, pt unable to state any of her precautions. Educated pt with pt verbalizing understanding.  Pt stated her pain was so bad she requested back to bed. Assisted pt to bed min Ax2 with pt immediately laying back onto bed sideways. Educated pt on safety awareness and sitting up on EOB once more in order to doff safety belt and brace with pt refusing to sit up anymore. Pt required max Ax2 to safely be positioned in bed requiring mod A to roll in order to get safety belt and brace off of her. Pt was left supine in bed with call bell within reach and all needs met. Current Level of Function Impacting Discharge (ADLs): pain    Other factors to consider for discharge: self limiting, home alone         PLAN :  Patient continues to benefit from skilled intervention to address the above impairments. Continue treatment per established plan of care. to address goals. Recommendation for discharge: (in order for the patient to meet his/her long term goals)  Justin Taylor discharge recommendation:  Has been made in collaboration with the attending provider and/or case management    IF patient discharges home will need the following DME: TBD       SUBJECTIVE:   Patient stated my pain is an 8,9,10/10.     OBJECTIVE DATA SUMMARY:   Cognitive/Behavioral Status:  Neurologic State: Alert  Orientation Level: Oriented X4  Cognition: Decreased command following;Poor safety awareness    Functional Mobility and Transfers for ADLs:  Bed Mobility:  Rolling: Moderate assistance;Assist x1  Supine to Sit: Moderate assistance;Assist x2  Sit to Supine: Maximum assistance;Assist x2  Scooting: Moderate assistance;Assist x1    Transfers:  Sit to Stand: Minimum assistance;Assist x2  Functional Transfers  Toilet Transfer : Assist x1; Moderate assistance  Bed to Chair: Minimum assistance;Assist x1    Balance:  Sitting: High guard; Intact  Standing: Impaired; With support  Standing - Static: Fair  Standing - Dynamic : Poor    ADL Intervention:  Toileting  Bladder Hygiene: Stand-by assistance    Pain:  10/10    Activity Tolerance:   Poor  Please refer to the flowsheet for vital signs taken during this treatment. After treatment patient left in no apparent distress:   Supine in bed, Call bell within reach, and Side rails x 3    COMMUNICATION/COLLABORATION:   The patients plan of care was discussed with: Physical therapy assistant and Registered nurse. Co treatment provided with PTA this date for increased clinician and patient safety.     Michael Duncan  Time Calculation: 26 mins

## 2022-06-25 NOTE — PROGRESS NOTES
Problem: Falls - Risk of  Goal: *Absence of Falls  Description: Document Bobby Colmenares Fall Risk and appropriate interventions in the flowsheet.   Outcome: Progressing Towards Goal  Note: Fall Risk Interventions:  Mobility Interventions: Bed/chair exit alarm,Patient to call before getting OOB    Mentation Interventions: Bed/chair exit alarm,Door open when patient unattended,More frequent rounding,Room close to nurse's station    Medication Interventions: Bed/chair exit alarm    Elimination Interventions: Bed/chair exit alarm,Call light in reach,Patient to call for help with toileting needs    History of Falls Interventions: Bed/chair exit alarm,Door open when patient unattended

## 2022-06-26 LAB
ERYTHROCYTE [DISTWIDTH] IN BLOOD BY AUTOMATED COUNT: 13.2 % (ref 11.5–14.5)
HCT VFR BLD AUTO: 24.8 % (ref 35–47)
HGB BLD-MCNC: 8.3 G/DL (ref 11.5–16)
MCH RBC QN AUTO: 32.3 PG (ref 26–34)
MCHC RBC AUTO-ENTMCNC: 33.5 G/DL (ref 30–36.5)
MCV RBC AUTO: 96.5 FL (ref 80–99)
NRBC # BLD: 0 K/UL (ref 0–0.01)
NRBC BLD-RTO: 0 PER 100 WBC
PLATELET # BLD AUTO: 211 K/UL (ref 150–400)
PMV BLD AUTO: 10 FL (ref 8.9–12.9)
RBC # BLD AUTO: 2.57 M/UL (ref 3.8–5.2)
WBC # BLD AUTO: 6.6 K/UL (ref 3.6–11)

## 2022-06-26 PROCEDURE — 85027 COMPLETE CBC AUTOMATED: CPT

## 2022-06-26 PROCEDURE — 74011250637 HC RX REV CODE- 250/637: Performed by: INTERNAL MEDICINE

## 2022-06-26 PROCEDURE — 97530 THERAPEUTIC ACTIVITIES: CPT

## 2022-06-26 PROCEDURE — 74011250637 HC RX REV CODE- 250/637: Performed by: FAMILY MEDICINE

## 2022-06-26 PROCEDURE — 74011250637 HC RX REV CODE- 250/637: Performed by: PHYSICIAN ASSISTANT

## 2022-06-26 PROCEDURE — 65270000029 HC RM PRIVATE

## 2022-06-26 PROCEDURE — 36415 COLL VENOUS BLD VENIPUNCTURE: CPT

## 2022-06-26 RX ORDER — BISACODYL 5 MG
10 TABLET, DELAYED RELEASE (ENTERIC COATED) ORAL
Status: DISCONTINUED | OUTPATIENT
Start: 2022-06-26 | End: 2022-06-27 | Stop reason: HOSPADM

## 2022-06-26 RX ADMIN — APIXABAN 5 MG: 5 TABLET, FILM COATED ORAL at 09:14

## 2022-06-26 RX ADMIN — METOPROLOL TARTRATE 25 MG: 25 TABLET, FILM COATED ORAL at 09:14

## 2022-06-26 RX ADMIN — BISACODYL 10 MG: 5 TABLET, COATED ORAL at 20:25

## 2022-06-26 RX ADMIN — POLYETHYLENE GLYCOL 3350 17 G: 17 POWDER, FOR SOLUTION ORAL at 09:14

## 2022-06-26 RX ADMIN — OXYCODONE HYDROCHLORIDE 10 MG: 10 TABLET ORAL at 04:37

## 2022-06-26 RX ADMIN — METOPROLOL TARTRATE 25 MG: 25 TABLET, FILM COATED ORAL at 20:25

## 2022-06-26 RX ADMIN — APIXABAN 5 MG: 5 TABLET, FILM COATED ORAL at 20:25

## 2022-06-26 NOTE — PROGRESS NOTES
Problem: Falls - Risk of  Goal: *Absence of Falls  Description: Document Anthonymon Leticia Fall Risk and appropriate interventions in the flowsheet.   Outcome: Progressing Towards Goal  Note: Fall Risk Interventions:  Mobility Interventions: Bed/chair exit alarm    Mentation Interventions: Bed/chair exit alarm    Medication Interventions: Bed/chair exit alarm    Elimination Interventions: Bed/chair exit alarm    History of Falls Interventions: Bed/chair exit alarm

## 2022-06-26 NOTE — PROGRESS NOTES
Bedside shift change report given to Agusto Fuller (oncoming nurse) by Dennie Scot (offgoing nurse). Report included the following information SBAR.

## 2022-06-26 NOTE — PROGRESS NOTES
Problem: Mobility Impaired (Adult and Pediatric)  Goal: *Acute Goals and Plan of Care (Insert Text)  Description: Patient stated goal:get rid of the pain  Patient will move from supine to sit and sit to supine , scoot up and down, and roll side to side in bed with modified independence within 7 day(s). Patient will transfer from bed to chair and chair to bed with modified independence using the least restrictive device within 7 day(s). Patient will improve static standing balance to modified independence within 1 week(s). Patient will ambulate 50 feet with minimal assistance with least restrictive device within 1 weeks. Outcome: Progressing Towards Goal   PHYSICAL THERAPY TREATMENT  Patient: Jaimie Krishnamurthy [de-identified]81 y.o. female)  Date: 6/26/2022  Diagnosis: Lumbar compression fracture ([address] [S32.000A]  Intractable low back pain [M54.59]  Compression fracture of body of thoracic vertebra ([address] [S22.000A] <principal problem not specified>       Precautions:    Chart, physical therapy assessment, plan of care and goals were reviewed. ASSESSMENT  Patient continues with skilled PT services and is progressing towards goals. Upon entry into room, patient semi-supine in bed and agreeable to work with therapy. Bed mobility performed with modA x 1 to come to sitting on EOB. Patient sat on EOB with good sitting balance noted and while PT donned TLSO. Sit<>stand with RW and Jaret x 1. Patient ambulated a few feet to chair with RW and Jaret. Good descent into chair noted. Patient sat up for approx 20 minutes total to work on tolerance to upright positioning as well as LE therex. She requires lengthy rest breaks throughout session due to high pain levels. Sit<>stand from chair with Jaret and transfer back to bed with RW for support. Patient sat on EOB so TLSO could be doffed. CGA to return to supine, Jaret to scoot in bed to position to comfort.  Patient much improved this session and likely does not require assist x 2 anymore for OOB mobility; however, may benefit from assist x 2 for longer distances for safety of patient/clinicians. She will benefit from continued skilled PT services to improve her strength and mobility to decr assistance from caregivers. Current Level of Function Impacting Discharge (mobility/balance): decr strength, decr mobility, decr endurance     Other factors to consider for discharge: severity of pain, TLSO, lumbar fracture, medical hx, lives alone          PLAN :  Patient continues to benefit from skilled intervention to address the above impairments. Continue treatment per established plan of care. to address goals. Recommendation for discharge: (in order for the patient to meet his/her long term goals)  Justin Self    This discharge recommendation:  Has been made in collaboration with the attending provider and/or case management    IF patient discharges home will need the following DME: rolling walker and to be determined (TBD)       SUBJECTIVE:   Patient stated I want to go by Mrs. Rei Galarza.     OBJECTIVE DATA SUMMARY:   Critical Behavior:  Neurologic State: Alert  Orientation Level: Oriented X4  Cognition: Follows commands,Appropriate for age attention/concentration     Functional Mobility Training:  Bed Mobility:  Rolling: Moderate assistance;Assist x1  Supine to Sit: Moderate assistance;Assist x1  Sit to Supine: Contact guard assistance  Scooting: Minimum assistance;Assist x1        Transfers:  Sit to Stand: Minimum assistance;Assist x1  Stand to Sit: Minimum assistance;Assist x1        Bed to Chair: Minimum assistance;Assist x1  Incr time to transfer and cues for hand placement      Balance:  Sitting: Intact; High guard  Sitting - Static: Good (unsupported)  Sitting - Dynamic: Fair (occasional)  Standing: Impaired; With support  Standing - Static: Good;Constant support  Standing - Dynamic : Fair;Constant support  Ambulation/Gait Training:  She ambulated a few steps to chair to sit up and back to bed at end of session. Use of RW and Jaret for ambulation as well as additional time for safety    Therapeutic Exercises: In sitting, 10x: marching, LAQ with rest breaks between reps due to pain     Pain Ratin/10 in back     Activity Tolerance:   Poor, SpO2 stable on RA, requires frequent rest breaks, and observed SOB with activity  Please refer to the flowsheet for vital signs taken during this treatment.     After treatment patient left in no apparent distress:   Supine in bed, Call bell within reach, and Bed / chair alarm activated    COMMUNICATION/COLLABORATION:   The patients plan of care was discussed with: Physical therapist.     Elana Granado   Time Calculation: 35 mins

## 2022-06-26 NOTE — DISCHARGE SUMMARY
Discharge Summary     Patient: Liban Wells MRN: [id number]  SSN: [id number]    YOB: 1935  Age: 81 y.o. Sex: female       Admit Date: 6/20/2022    Discharge Date: 6/26/2022      Admission Diagnoses: Lumbar compression fracture ([facility] [S32.000A]  Intractable low back pain [M54.59]  Compression fracture of body of thoracic vertebra ([facility] [S22.000A]    Discharge Diagnoses:   Problem List as of 6/26/2022 Date Reviewed: 10/13/2021          Codes Class Noted - Resolved    Compression fracture of body of thoracic vertebra ([facility] ICD-10-CM: S22.000A  ICD-9-CM: 805.2  6/21/2022 - Present        Lumbar compression fracture ([facility] ICD-10-CM: S32.000A  ICD-9-CM: 805.4  6/20/2022 - Present        Intractable low back pain ICD-10-CM: M54.59  ICD-9-CM: 724.2  6/20/2022 - Present        UTI (urinary tract infection) ICD-10-CM: N39.0  ICD-9-CM: 599.0  10/13/2021 - Present        Altered mental status ICD-10-CM: R41.82  ICD-9-CM: 780.97  10/13/2021 - Present        Systemic adverse effect of COVID-19 vaccine ICD-10-CM: T50. B95A  ICD-9-CM: E949.6  10/13/2021 - Present        Sepsis ([facility] ICD-10-CM: A41.9  ICD-9-CM: 038.9, 995.91  10/13/2021 - Present        Infectious encephalopathy ICD-10-CM: G93.49, B99.9  ICD-9-CM: 348.39, 136.9  10/13/2021 - Present               Discharge Condition: Good    Hospital Course: 81year-old patient with atrial fibrillation  With a complaint of severe back pain patient had a CT which showed bilateral femoral head osteonecrosis and also showed severe L2 compression fracture with spinal canal stenosis and neuroforaminal narrowing seen by interventional radiology and had an kyphoplasty done at L1 and L2 with recommendation for rehab follow-up with interventional radiology in 1 month    Consults: Interventional radiology    Significant Diagnostic Studies: labs:   Recent Results (from the past 24 hour(s))   CBC W/O DIFF    Collection Time: 06/26/22  7:20 AM   Result Value Ref Range    WBC 6.6 3.6 - 11.0 K/uL    RBC 2.57 (L) 3.80 - 5.20 M/uL    HGB 8.3 (L) 11.5 - 16.0 g/dL    HCT 24.8 (L) 35.0 - 47.0 %    MCV 96.5 80.0 - 99.0 FL    MCH 32.3 26.0 - 34.0 PG    MCHC 33.5 30.0 - 36.5 g/dL    RDW 13.2 11.5 - 14.5 %    PLATELET 497 833 - 569 K/uL    MPV 10.0 8.9 - 12.9 FL    NRBC 0.0 0.0  WBC    ABSOLUTE NRBC 0.00 0.00 - 0.01 K/uL         IR KYPHOPLASTY LUMBAR   Final Result   1. Successful kyphoplasty and bone biopsy of L1.   2.  Successful kyphoplasty of L2. A fracture has occurred and the patient should be considered for osteoporosis   treatment or testing. IR VERTEBROPLASTY FLUOROSCOPIC   Final Result   1. Successful kyphoplasty and bone biopsy of L1.   2.  Successful kyphoplasty of L2. A fracture has occurred and the patient should be considered for osteoporosis   treatment or testing. XR FLUOROSCOPY UNDER 60 MINUTES   Final Result   1. Successful kyphoplasty and bone biopsy of L1.   2.  Successful kyphoplasty of L2. A fracture has occurred and the patient should be considered for osteoporosis   treatment or testing. MRI LUMB SPINE WO CONT   Final Result   1. Compression fractures of L1 and L2 as above. Heterogeneous signal within the   L1 vertebral body, which may represent an underlying lesion, such as a   hemangioma or other. Correlate clinically for possibility of malignancy. 2.  Abnormal ventral epidural signal at the level of T12, which may represent   superior extension of ventral epidural hemorrhage secondary to L1 vertebral body   fracture. Alternatively, findings may represent a caudally migrating disc   extrusion. 3.  Associated posterior paraspinal and anterior paravertebral edema with edema   extending down the left psoas. 4.  Multilevel degenerative changes of the lumbar spine as detailed by level   above. CT SPINE LUMB WO CONT   Final Result   1. Severe L2 compression fracture with approximate 75% loss of height.    2. Mild L1 compression fracture with less than 10% loss of height. 3. Multilevel degenerative changes in the lumbar spine with spinal canal   stenosis and neural foraminal narrowing. 4. Please see above report for further details. CT PELV WO CONT   Final Result   1. 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 2. There is a 13 mm left adnexal cystic lesion. This could be further evaluated   with pelvic sonogram on a nonemergent basis. CT SPINE CERV WO CONT   Final Result   1. Height loss of the C7 vertebral body, which may represent an   age-indeterminate compression fracture. This could be further evaluated with MRI   to determine if there is any bone marrow edema at this location. 2. Degenerative changes in the cervical spine. There is mild anterolisthesis of   C4 on C5, which is favored to be related to facet osteoarthritis. Disposition: SNF    Discharge Medications:   Current Discharge Medication List      START taking these medications    Details   apixaban (ELIQUIS) 5 mg tablet Take 1 Tablet by mouth two (2) times a day. Qty: 60 Tablet, Refills: 0      metoprolol tartrate (LOPRESSOR) 25 mg tablet Take 1 Tablet by mouth two (2) times a day. Qty: 60 Tablet, Refills: 0      cyclobenzaprine (FLEXERIL) 10 mg tablet Take 0.5 Tablets by mouth three (3) times daily as needed for Muscle Spasm(s). Qty: 30 Tablet, Refills: 0      enoxaparin (LOVENOX) 40 mg/0.4 mL 0.4 mL by SubCUTAneous route daily. Qty: 30 Each, Refills: 0      oxyCODONE IR (ROXICODONE) 5 mg immediate release tablet Take 1 Tablet by mouth every four (4) hours as needed for Pain for up to 3 days.  Max Daily Amount: 30 mg.  Qty: 15 Tablet, Refills: 0    Associated Diagnoses: Compression fracture of L1 vertebra, initial encounter ([facility]         STOP taking these medications       acetaminophen (TYLENOL) 500 mg tablet Comments:   Reason for Stopping:               Activity: PT/OT Eval and Treat  Diet: Resume previous diet  Wound Care: Routine catheter care and As directed    Follow-up Appointments   Procedures    FOLLOW UP VISIT Appointment in: 3 - 5 Days     Standing Status:   Standing     Number of Occurrences:   1     Order Specific Question:   Appointment in     Answer:   3 - 5 Days     40 minutes discharge time  Dulcolax for constipation    Signed By: Tori Vargas MD     June 26, 2022

## 2022-06-26 NOTE — PROGRESS NOTES
DC order noted. Chart reviewed. [facility] has accepted the pt for post hospital care pending final medical  record review    CM will monitor  for decision and bed availability this date. Updates faxed via [employer] to [phone] [facility]    UPDATE:  Received call back from [facility]. A representative will visit the pt at this facility on Monday 06/27. Medicare pt has received, reviewed, and signed 2nd IM letter informing them of their right to appeal the discharge. Signed copy filed in Med Rec Dept.     Delay entered previously

## 2022-06-27 VITALS
HEART RATE: 85 BPM | BODY MASS INDEX: 27.31 KG/M2 | DIASTOLIC BLOOD PRESSURE: 51 MMHG | SYSTOLIC BLOOD PRESSURE: 105 MMHG | HEIGHT: 64 IN | RESPIRATION RATE: 18 BRPM | TEMPERATURE: 98 F | WEIGHT: 160 LBS | OXYGEN SATURATION: 97 %

## 2022-06-27 LAB — COVID-19 RAPID TEST, COVR: NOT DETECTED

## 2022-06-27 PROCEDURE — 74011250637 HC RX REV CODE- 250/637: Performed by: PHYSICIAN ASSISTANT

## 2022-06-27 PROCEDURE — 74011250636 HC RX REV CODE- 250/636: Performed by: FAMILY MEDICINE

## 2022-06-27 PROCEDURE — 74011250637 HC RX REV CODE- 250/637: Performed by: FAMILY MEDICINE

## 2022-06-27 PROCEDURE — 97530 THERAPEUTIC ACTIVITIES: CPT

## 2022-06-27 PROCEDURE — 74011250637 HC RX REV CODE- 250/637: Performed by: INTERNAL MEDICINE

## 2022-06-27 PROCEDURE — 87635 SARS-COV-2 COVID-19 AMP PRB: CPT

## 2022-06-27 RX ADMIN — METOPROLOL TARTRATE 25 MG: 25 TABLET, FILM COATED ORAL at 09:28

## 2022-06-27 RX ADMIN — APIXABAN 5 MG: 5 TABLET, FILM COATED ORAL at 09:28

## 2022-06-27 RX ADMIN — ONDANSETRON 4 MG: 2 INJECTION INTRAMUSCULAR; INTRAVENOUS at 00:09

## 2022-06-27 RX ADMIN — BISACODYL 10 MG: 5 TABLET, COATED ORAL at 05:02

## 2022-06-27 RX ADMIN — OXYCODONE HYDROCHLORIDE 10 MG: 10 TABLET ORAL at 05:02

## 2022-06-27 RX ADMIN — ACETAMINOPHEN 650 MG: 325 TABLET ORAL at 09:28

## 2022-06-27 NOTE — PROGRESS NOTES
Nutrition Assessment     Type and Reason for Visit: RD nutrition re-screen/LOS    Nutrition Recommendations/Plan:   1. Continue w/ current diet   2. Monitor and Record wts, po intakes & BMs in I/Os      Nutrition Assessment:  admitted w/ Lumbar compression fracture. Pending placement. No recent wt. loss, slight decreased in appetite 2/2 not feeling well, appetite approved from 25-50% to 75% at meals x 7days. Reviewed labs and meds, no further nutrition concerns. Malnutrition Assessment:  Malnutrition Status: No malnutrition       Nutrition Related Findings:  Appears nourished. No reported n/v/c/d nor edema. last BM (6/25). No hx of dysphagia.     Current Nutrition Therapies:  ADULT DIET Regular    Anthropometric Measures:  Height:  5' 4\" (162.6 cm)  Current Body Wt:  72.6 kg (160 lb)  BMI: 27.5    Nutrition Diagnosis:   · No nutrition diagnosis at this time related to   as evidenced by        Nutrition Interventions:   Food and/or Nutrient Delivery: Continue current diet  Nutrition Education/Counseling: No recommendations at this time  Coordination of Nutrition Care: Continue to monitor while inpatient      Discharge Planning:    Continue current diet    Lizabeth Bowman  Contact: [phone]

## 2022-06-27 NOTE — PROGRESS NOTES
PHYSICAL THERAPY TREATMENT  Patient: Jake Petit (39 y.o. female)  Date: 6/27/2022  Diagnosis: Lumbar compression fracture (HCC) [S32.000A]  Intractable low back pain [M54.59]  Compression fracture of body of thoracic vertebra ([address] [S22.000A] <principal problem not specified>       Precautions:    Chart, physical therapy assessment, plan of care and goals were reviewed. ASSESSMENT  Patient continues with skilled PT services and is progressing towards goals. Patient supine in bed upon approach and agreed to therapy. Patient was AXO times 4 but could not recall her spinal precautions or log rolling technique. Patient re educated. Patient then performed log rolling for bed mobility and mod A, required mod A for supine>sit and CGA for scooting to EOB. Patient able to assist with donning TLSO brace before ambulation. Patient then was SBA for STS to RW  where she ambulated around room for 25 feet before taking seated rest break on EOB. Patient had uncontrolled descent onto EOB. Patient educated on proper sitting technique. Patient then performed seated TE ( see details below). Patient then performed STS to RW again at [facility] and ambulated 5 feet to bedside recliner chair where she had another uncontrolled descent into chair. Patients gait was slow but steady with no LOB or knee buckling during all ambulations Patient then left seated in chair with call bell within reach and all needs meet. Current Level of Function Impacting Discharge (mobility/balance): impaired balance, general weakness, poor activity tolerance    Other factors to consider for discharge: PLOF, assistance at home, level of deficits, acute medical state         PLAN :  Patient continues to benefit from skilled intervention to address the above impairments. Continue treatment per established plan of care. to address goals.     Recommendation for discharge: (in order for the patient to meet his/her long term goals)  Justin Taylor discharge recommendation:  Has been made in collaboration with the attending provider and/or case management    IF patient discharges home will need the following DME: gait belt and rolling walker       SUBJECTIVE:   Patient stated Rochelle Costello will sit in the chair for a bit.     OBJECTIVE DATA SUMMARY:   Critical Behavior:  Neurologic State: Alert  Orientation Level: Oriented X4  Cognition: Follows commands     Functional Mobility Training:  Bed Mobility:  Rolling: Moderate assistance;Assist x1  Supine to Sit: Moderate assistance;Assist x1  Sit to Supine: Moderate assistance  Scooting: Contact guard assistance; Additional time  Transfers:  Sit to Stand: Contact guard assistance  Stand to Sit: Contact guard assistance;Minimum assistance  Bed to Chair: Contact guard assistance  Balance:  Sitting: Intact; With support  Sitting - Static: Good (unsupported)  Sitting - Dynamic: Fair (occasional)  Standing: Impaired; With support  Standing - Static: Constant support;Good  Standing - Dynamic : Constant support; Fair  Ambulation/Gait Training:  Distance (ft): 30 Feet (ft)  Assistive Device: Gait belt;Walker, rolling  Ambulation - Level of Assistance: Contact guard assistance    Therapeutic Exercises:   1x20 AP  1x15 LAQ  Pain Ratin-9/10 in lower back    Activity Tolerance:   Fair and requires rest breaks  Please refer to the flowsheet for vital signs taken during this treatment. After treatment patient left in no apparent distress:   Sitting in chair and Call bell within reach    COMMUNICATION/COLLABORATION:   The patients plan of care was discussed with: Occupational therapy assistant and Registered nurse. Treatment occurred with OT due to patient requiring Ax2 for mobility and safety.        Problem: Mobility Impaired (Adult and Pediatric)  Goal: *Acute Goals and Plan of Care (Insert Text)  Description: Patient stated goal:get rid of the pain  Patient will move from supine to sit and sit to supine , scoot up and down, and roll side to side in bed with modified independence within 7 day(s). Patient will transfer from bed to chair and chair to bed with modified independence using the least restrictive device within 7 day(s). Patient will improve static standing balance to modified independence within 1 week(s). Patient will ambulate 50 feet with minimal assistance with least restrictive device within 1 weeks.      Outcome: Progressing Towards Goal       Rosalinda Albert PTA   Time Calculation: 29 mins

## 2022-06-27 NOTE — DISCHARGE INSTRUCTIONS
Discharge Instructions       PATIENT ID: Anisha Hester  MRN: [id number]   YOB: 1935    DATE OF ADMISSION: 6/20/2022  6:47 PM    DATE OF DISCHARGE: 6/23/2022    PRIMARY CARE PROVIDER: None     ATTENDING PHYSICIAN: Maxwell Knight MD  DISCHARGING PROVIDER: Henry Thurston MD    To contact this individual call [phone] and ask the  to page. If unavailable ask to be transferred the Adult Hospitalist Department. DISCHARGE DIAGNOSES compression fracture     CONSULTATIONS: IP CONSULT TO HOSPITALIST  IP CONSULT TO ORTHOPEDIC SURGERY  IP CONSULT TO INTERVENTIONAL RADIOLOGY    PROCEDURES/SURGERIES: * No surgery found *    PENDING TEST RESULTS:   At the time of discharge the following test results are still pending: None    FOLLOW UP APPOINTMENTS:   Follow-up Information     Follow up With Specialties Details Why Izaiah Birmingham MD [address] [address] [phone]        In 1 week             ADDITIONAL CARE RECOMMENDATIONS: Physical therapy    DIET: Cardiac Diet      ACTIVITY: Activity as tolerated    Wound care: Wound Care Order: submitted to [facility] Please view [email or web address]    EQUIPMENT needed: none      DISCHARGE MEDICATIONS:   See Medication Reconciliation Form    · It is important that you take the medication exactly as they are prescribed. · Keep your medication in the bottles provided by the pharmacist and keep a list of the medication names, dosages, and times to be taken in your wallet. · Do not take other medications without consulting your doctor. NOTIFY YOUR PHYSICIAN FOR ANY OF THE FOLLOWING:   Fever over 101 degrees for 24 hours. Chest pain, shortness of breath, fever, chills, nausea, vomiting, diarrhea, change in mentation, falling, weakness, bleeding. Severe pain or pain not relieved by medications.   Or, any other signs or symptoms that you may have questions about.      DISPOSITION:    Home With:   OT  PT  HH  RN       SNF/Inpatient Rehab/LTAC    Independent/assisted living    Hospice    Other:         PROBLEM LIST Updated:  Yes ***       Signed:   Matilde Serra MD  6/23/2022  8:47 AM     Discharge Instructions       PATIENT ID: Oswaldo Brokos  MRN: [id number]   YOB: 1935    DATE OF ADMISSION: 6/20/2022  6:47 PM    DATE OF DISCHARGE: 6/23/2022    PRIMARY CARE PROVIDER: None     ATTENDING PHYSICIAN: Kayla Taylor MD  DISCHARGING PROVIDER: Matilde Serra MD    To contact this individual call [phone] and ask the  to page. If unavailable ask to be transferred the Adult Hospitalist Department. DISCHARGE DIAGNOSES ***    CONSULTATIONS: IP CONSULT TO HOSPITALIST  IP CONSULT TO ORTHOPEDIC SURGERY  IP CONSULT TO INTERVENTIONAL RADIOLOGY    PROCEDURES/SURGERIES: * No surgery found *    PENDING TEST RESULTS:   At the time of discharge the following test results are still pending: ***    FOLLOW UP APPOINTMENTS:   Follow-up Information     Follow up With Specialties Details Why Hans Bailey MD [facility]   [address]  [phone]        In 1 week             ADDITIONAL CARE RECOMMENDATIONS: ***    DIET: {diet:79831}      ACTIVITY: {discharge activity:86361}    Wound care: {[facility] Instructions:75448}    EQUIPMENT needed: ***      DISCHARGE MEDICATIONS:   See Medication Reconciliation Form    · It is important that you take the medication exactly as they are prescribed. · Keep your medication in the bottles provided by the pharmacist and keep a list of the medication names, dosages, and times to be taken in your wallet. · Do not take other medications without consulting your doctor. NOTIFY YOUR PHYSICIAN FOR ANY OF THE FOLLOWING:   Fever over 101 degrees for 24 hours.    Chest pain, shortness of breath, fever, chills, nausea, vomiting, diarrhea, change in mentation, falling, weakness, bleeding. Severe pain or pain not relieved by medications. Or, any other signs or symptoms that you may have questions about.       DISPOSITION:    Home With:   OT  PT  HH  RN       SNF/Inpatient Rehab/LTAC    Independent/assisted living    Hospice    Other:         PROBLEM LIST Updated:  Yes ***       Signed:   Agnes Rodas MD  6/23/2022  8:47 AM

## 2022-06-27 NOTE — PROGRESS NOTES
Spoke with patient about discharge plan to see if plans still where home with home health. Explained differences with HH and SNF. Patient stated his son is coming to stay with him to help him at home and would like to pursue home health. informed him he has been accepted with [facility] for Pt/OT and skilled nursing once he is discharged and orders are sent. Also spoke to patient brother who has concerns for patient going home. Explained nothing I can do from my standpoint due to patient choice but informed him of recommendations and told him he can talk with his brother and see if he can convince him to go to SNF. Brother said he would speak with brother.

## 2022-06-27 NOTE — PROGRESS NOTES
Spiritual Care Assessment/Progress Note  [facility]      NAME: Cole Polanco      MRN: [id number]  AGE: 81 y.o.  SEX: female  Congregational Affiliation: No preference   Language: English     6/27/2022     Total Time (in minutes): 17     Spiritual Assessment begun in [facility] SURGICAL through conversation with:         [x]Patient        [] Family    [] Friend(s)        Reason for Consult: Initial/Spiritual assessment, patient floor     Spiritual beliefs: (Please include comment if needed)     [] Identifies with a bonnie tradition:         [] Supported by a bonnie community:            [x] Claims no spiritual orientation:           [] Seeking spiritual identity:                [] Adheres to an individual form of spirituality:           [] Not able to assess:                           Identified resources for coping:      [] Prayer                               [] Music                  [] Guided Imagery     [x] Family/friends                 [] Pet visits     [] Devotional reading                         [] Unknown     [] Other:                                               Interventions offered during this visit: (See comments for more details)    Patient Interventions: Affirmation of emotions/emotional suffering,Catharsis/review of pertinent events in supportive environment,Coping skills reviewed/reinforced,Initial/Spiritual assessment, patient floor,Normalization of emotional/spiritual concerns,Life review/legacy           Plan of Care:     [] Support spiritual and/or cultural needs    [] Support AMD and/or advance care planning process      [] Support grieving process   [] Coordinate Rites and/or Rituals    [] Coordination with community clergy   [] No spiritual needs identified at this time   [] Detailed Plan of Care below (See Comments)  [] Make referral to Music Therapy  [] Make referral to Pet Therapy     [] Make referral to Addiction services  [] Make referral to [facility]  [] Make referral to Spiritual Care Partner  [] No future visits requested        [x] Contact Spiritual Care for further referrals     Comments:   Visited patient in [address] [place] [address] for initial assessment. Patient was alone during the visit. Patient shared about her [place], [place] and the death of her  of over 61 years now long ago. Stated she has support of her son who lives in another state. Does not identify with any bonnie tradition and shared about her future care in another facility. Provided chaplaincy education and supportive presence while listening with empathy and exploring her needs. Affirmed her familial bond and support as well as future care needs. Advised of  availability. Advised nurse to contact [facility] for any further referrals. Contact chaplains for further referrals. Chaplain Polo Love MTalya.    can be reached by calling the  at [facility]  [phone]

## 2022-06-27 NOTE — PROGRESS NOTES
OCCUPATIONAL THERAPY TREATMENT  Patient: Yadiel Ng (53 y.o. female)  Date: 6/27/2022  Diagnosis: Lumbar compression fracture ([facility] [S32.000A]  Intractable low back pain [M54.59]  Compression fracture of body of thoracic vertebra ([facility] [S22.000A] <principal problem not specified>       Precautions:    Chart, occupational therapy assessment, plan of care, and goals were reviewed. ASSESSMENT  Patient continues with skilled OT services and is progressing towards goals. Upon LOWE/PTA arrival, pt semi supine in bed and agreeable to tx session. Pt completed bed mobility with Harry x2 rolling, Harry x2 supine>sit, and scooting to EOB with CGA. Donning of socks completed with total A. Pt required increased time, education, and cueing for reviewing of spinal precautions. Pt impulsively completed sit>stand from EOB with CGA using HHA, requiring cueing to return to EOB. Donning of TLSO completed with Max/total A while sitting at EOB. Pt completed sit>stand from EOB with Harry using RW for balance upon standing. Pt ambulated in room with PTA using RW and CGA/Harry. Pt completed transfer to EOB for seated rest break and completed seated UE therex (see chart below for details). Pt completed transfer from EOB>recliner with CGA using RW. Pt completed seated face washing setup A. Pt left sitting in recliner with call bell within reach and needs met. Will continue to follow pt throughout remainder of stay and progress towards OT goals. Recommending SNF at discharge when medically appropriate. Other factors to consider for discharge: family/social support, DME, time since onset, severity of deficits, decline from functional baseline         PLAN :  Patient continues to benefit from skilled intervention to address the above impairments. Continue treatment per established plan of care. to address goals.     Recommendation for discharge: (in order for the patient to meet his/her long term goals)  Skilled Nursing Facility    This discharge recommendation:  Has been made in collaboration with the attending provider and/or case management    IF patient discharges home will need the following DME: TBD       SUBJECTIVE:   Patient stated \"I can't get up, my back hurts.     OBJECTIVE DATA SUMMARY:   Cognitive/Behavioral Status:  Neurologic State: Alert  Orientation Level: Oriented X4  Cognition: Follows commands    Functional Mobility and Transfers for ADLs:  Bed Mobility:  Rolling: Minimum assistance;Assist x2  Supine to Sit: Minimum assistance;Assist x2  Scooting: Contact guard assistance; Additional time    Transfers:  Sit to Stand: Contact guard assistance;Minimum assistance     Bed to Chair: Contact guard assistance    Balance:  Sitting: Intact; With support  Sitting - Static: Good (unsupported)  Sitting - Dynamic: Fair (occasional)  Standing: Impaired; With support  Standing - Static: Constant support;Good  Standing - Dynamic : Constant support; Fair    ADL Intervention:  Grooming  Position Performed: Seated in chair  Washing Face: Set-up    Upper Body Dressing Assistance  Orthotics(Brace): Maximum assistance; Total assistance (dependent)    Lower Body Dressing Assistance  Socks: Total assistance (dependent)    Therapeutic Exercises:   Exercise Sets Reps AROM AAROM PROM Self PROM Comments   Shoulder flex/ext 1 8 [x] [] [] []      Pain:  7-8/10 back pain prior to mobility  8-9/10 back pain after mobility    Activity Tolerance:   Fair and requires rest breaks  Please refer to the flowsheet for vital signs taken during this treatment. After treatment patient left in no apparent distress:   Sitting in chair and Call bell within reach    COMMUNICATION/COLLABORATION:   The patients plan of care was discussed with: Physical therapy assistant and Case management. [place]reat with PT for increased safety for pt and clinician.     MYNOR Brooks  Time Calculation: 30 mins    Problem: Self Care Deficits Care Plan (Adult)  Goal: *Acute Goals and Plan of Care (Insert Text)  Description: Pt stated goal \"to be in less pain\"  Pt will be Mod I sup <> sit in prep for EOB ADLs  Pt will be Mod I grooming standing sink side LRAD  Pt will be Mod I UB dressing sitting EOB/long sit   Pt will be Mod I LE dressing sitting EOB/long sit  Pt will be Mod I sit <>  prep for toileting LRAD  Pt will be Mod I toileting/toilet transfer/cloth mgmt LRAD  Pt will be IND following UE HEP in prep for self care tasks      Outcome: Progressing Towards Goal

## 2022-06-27 NOTE — PROGRESS NOTES
Discharge plan of care/case management plan validated with provider discharge order. Pt discharged to [address] [facility]. Pt IV removed. Pt Provided with discharge instructions. Pt report called and given to [facility]. Pt to report to room 208. Pt discharge summary signed and completed. Pt Left via [id number] [facility] ambulance. Pt belongings taken with transport.

## 2022-06-27 NOTE — PROGRESS NOTES
Patient with discharge order to SNF. Has been awaiting for authorization/verification from [address] [place] [address] and rehab. They are able to accept today. Currently waiting for covid result to fax over to them before they will accept. Patient documents have been faxed and ready for discharge. Discharge plan of care/case management plan validated with provider discharge order. Patient has room assignment room 208.   Nurse can call report at [phone]

## 2022-06-27 NOTE — PROGRESS NOTES
Problem: Falls - Risk of  Goal: *Absence of Falls  Description: Document Leafy Gross Fall Risk and appropriate interventions in the flowsheet.   Outcome: Progressing Towards Goal  Note: Fall Risk Interventions:  Mobility Interventions: Bed/chair exit alarm    Mentation Interventions: Bed/chair exit alarm    Medication Interventions: Bed/chair exit alarm    Elimination Interventions: Bed/chair exit alarm    History of Falls Interventions: Bed/chair exit alarm

## 2022-08-25 ENCOUNTER — TRANSCRIBE ORDER (OUTPATIENT)
Dept: SCHEDULING | Age: 87
End: 2022-08-25

## 2022-08-25 DIAGNOSIS — M48.56XA COLLAPSED VERTEBRA, NOT ELSEWHERE CLASSIFIED, LUMBAR REGION, INITIAL ENCOUNTER FOR FRACTURE (HCC): ICD-10-CM

## 2022-08-25 DIAGNOSIS — M51.24 OTHER INTERVERTEBRAL DISC DISPLACEMENT, THORACIC REGION: Primary | ICD-10-CM

## 2022-09-02 ENCOUNTER — HOSPITAL ENCOUNTER (OUTPATIENT)
Dept: MRI IMAGING | Age: 87
Discharge: HOME | End: 2022-09-02
Attending: STUDENT IN AN ORGANIZED HEALTH CARE EDUCATION/TRAINING PROGRAM
Payer: MEDICARE

## 2022-09-02 DIAGNOSIS — M51.24 OTHER INTERVERTEBRAL DISC DISPLACEMENT, THORACIC REGION: ICD-10-CM

## 2022-09-02 DIAGNOSIS — M48.56XA COLLAPSED VERTEBRA, NOT ELSEWHERE CLASSIFIED, LUMBAR REGION, INITIAL ENCOUNTER FOR FRACTURE (HCC): ICD-10-CM

## 2022-09-02 PROCEDURE — 72146 MRI CHEST SPINE W/O DYE: CPT

## 2022-09-02 PROCEDURE — 72148 MRI LUMBAR SPINE W/O DYE: CPT

## 2023-09-24 ENCOUNTER — APPOINTMENT (OUTPATIENT)
Facility: HOSPITAL | Age: 88
End: 2023-09-24
Payer: MEDICARE

## 2023-09-24 ENCOUNTER — HOSPITAL ENCOUNTER (EMERGENCY)
Facility: HOSPITAL | Age: 88
Discharge: HOME | End: 2023-09-24
Attending: EMERGENCY MEDICINE
Payer: MEDICARE

## 2023-09-24 VITALS
TEMPERATURE: 98.4 F | DIASTOLIC BLOOD PRESSURE: 66 MMHG | BODY MASS INDEX: 25.76 KG/M2 | RESPIRATION RATE: 18 BRPM | SYSTOLIC BLOOD PRESSURE: 129 MMHG | HEIGHT: 62 IN | HEART RATE: 79 BPM | WEIGHT: 140 LBS | OXYGEN SATURATION: 99 %
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
## 2023-09-24 DIAGNOSIS — S41.111A LACERATION OF RIGHT UPPER EXTREMITY, INITIAL ENCOUNTER: ICD-10-CM

## 2023-09-24 PROCEDURE — 2500000003 HC RX 250 WO HCPCS: Performed by: EMERGENCY MEDICINE

## 2023-09-24 PROCEDURE — 90714 TD VACC NO PRESV 7 YRS+ IM: CPT | Performed by: EMERGENCY MEDICINE

## 2023-09-24 PROCEDURE — 90471 IMMUNIZATION ADMIN: CPT | Performed by: EMERGENCY MEDICINE

## 2023-09-24 PROCEDURE — 6370000000 HC RX 637 (ALT 250 FOR IP): Performed by: EMERGENCY MEDICINE

## 2023-09-24 PROCEDURE — 73110 X-RAY EXAM OF WRIST: CPT

## 2023-09-24 PROCEDURE — 6360000002 HC RX W HCPCS: Performed by: EMERGENCY MEDICINE

## 2023-09-24 PROCEDURE — 99284 EMERGENCY DEPT VISIT MOD MDM: CPT

## 2023-09-24 RX ORDER — LIDOCAINE HYDROCHLORIDE 10 MG/ML
10 INJECTION, SOLUTION INFILTRATION; PERINEURAL
Status: COMPLETED | OUTPATIENT
Start: 2023-09-24 | End: 2023-09-24

## 2023-09-24 RX ORDER — ACETAMINOPHEN 325 MG/1
650 TABLET ORAL
Status: COMPLETED | OUTPATIENT
Start: 2023-09-24 | End: 2023-09-24

## 2023-09-24 RX ORDER — TRAMADOL HYDROCHLORIDE 50 MG/1
25 TABLET ORAL
Status: COMPLETED | OUTPATIENT
Start: 2023-09-24 | End: 2023-09-24

## 2023-09-24 RX ORDER — SULFAMETHOXAZOLE AND TRIMETHOPRIM 800; 160 MG/1; MG/1
2 TABLET ORAL 2 TIMES DAILY
Qty: 40 TABLET | Refills: 0 | Status: SHIPPED | OUTPATIENT
Start: 2023-09-24 | End: 2023-10-04

## 2023-09-24 RX ADMIN — LIDOCAINE HYDROCHLORIDE 10 ML: 10 INJECTION, SOLUTION EPIDURAL; INFILTRATION; INTRACAUDAL; PERINEURAL at 15:44

## 2023-09-24 RX ADMIN — CLOSTRIDIUM TETANI TOXOID ANTIGEN (FORMALDEHYDE INACTIVATED) AND CORYNEBACTERIUM DIPHTHERIAE TOXOID ANTIGEN (FORMALDEHYDE INACTIVATED) 0.5 ML: 5; 2 INJECTION, SUSPENSION INTRAMUSCULAR at 15:47

## 2023-09-24 RX ADMIN — TRAMADOL HYDROCHLORIDE 25 MG: 50 TABLET ORAL at 15:47

## 2023-09-24 RX ADMIN — ACETAMINOPHEN 650 MG: 325 TABLET ORAL at 15:47

## 2023-09-24 ASSESSMENT — PAIN SCALES - GENERAL: PAINLEVEL_OUTOF10: 8

## 2023-09-24 ASSESSMENT — PAIN - FUNCTIONAL ASSESSMENT: PAIN_FUNCTIONAL_ASSESSMENT: 0-10

## 2023-09-24 ASSESSMENT — LIFESTYLE VARIABLES
HOW OFTEN DO YOU HAVE A DRINK CONTAINING ALCOHOL: NEVER
HOW MANY STANDARD DRINKS CONTAINING ALCOHOL DO YOU HAVE ON A TYPICAL DAY: PATIENT DOES NOT DRINK

## 2023-09-24 NOTE — DISCHARGE INSTRUCTIONS
Thank you! Thank you for allowing me to care for you in the emergency department. It is my goal to provide you with excellent care. If you have not received excellent quality care, please ask to speak to the nurse manager. Please fill out the survey that will come to you by mail or email since we listen to your feedback! Below you will find a list of your tests from today's visit. Should you have any questions, please do not hesitate to call the emergency department. Labs  No results found for this or any previous visit (from the past 12 hour(s)). Radiologic Studies  XR WRIST RIGHT (MIN 3 VIEWS)   Final Result   No acute fracture or dislocation.        ------------------------------------------------------------------------------------------------------------  The exam and treatment you received in the Emergency Department were for an urgent problem and are not intended as complete care. It is important that you follow-up with a doctor, nurse practitioner, or physician assistant to:  (1) confirm your diagnosis,  (2) re-evaluation of changes in your illness and treatment, and  (3) for ongoing care. Please take your discharge instructions with you when you go to your follow-up appointment. If you have any problem arranging a follow-up appointment, contact the Emergency Department. If your symptoms become worse or you do not improve as expected and you are unable to reach your health care provider, please return to the Emergency Department. We are available 24 hours a day. If a prescription has been provided, please have it filled as soon as possible to prevent a delay in treatment. If you have any questions or reservations about taking the medication due to side effects or interactions with other medications, please call your primary care provider or contact the ER.

## 2023-09-24 NOTE — ED TRIAGE NOTES
Arrives to ED after fall out of [place]. Has laceration to right wrist and back of right arm after hitting it on metal baseboard.      +thinners, -LOC, -hitting head

## 2023-09-24 NOTE — ED PROVIDER NOTES
of sutures: 15. Technique: Simple interrupted. Tolerated: Well. Complications: None. Blank Gamez D.O.     Smoking Cessation: None. CRITICAL CARE TIME     Patient care does not meet critical care criteria. DISPOSITION/PLAN   DISPOSITION Decision To Discharge 09/24/2023 05:23:33 PM      Discharged. PATIENT REFERRED TO:  [facility] EMERGENCY DEPT  [place] [address]  [phone]  Go in 10 days  For suture removal      DISCHARGE MEDICATIONS:     Medication List        START taking these medications      sulfamethoxazole-trimethoprim 800-160 MG per tablet  Commonly known as: Bactrim DS  Take 2 tablets by mouth 2 times daily for 10 days            ASK your doctor about these medications      apixaban 5 MG Tabs tablet  Commonly known as: ELIQUIS     cyclobenzaprine 10 MG tablet  Commonly known as: FLEXERIL     enoxaparin 40 MG/0.4ML  Commonly known as: LOVENOX     metoprolol tartrate 25 MG tablet  Commonly known as: LOPRESSOR               Where to Get Your Medications        These medications were sent to [address] [place] [address] [phone] -  [phone]  [address] [place] [address]      Phone: [phone]   sulfamethoxazole-trimethoprim 800-160 MG per tablet         DISCONTINUED MEDICATIONS:  Current Discharge Medication List          ED FINAL IMPRESSION     1. Fall, initial encounter    2. Laceration of right upper extremity, initial encounter           I am the primary provider of record. Cydney Mancera DO (electronically signed)    (Please note that parts of this dictation were completed with voice recognition software. Quite often unanticipated grammatical, syntax, homophones, and other interpretive errors are inadvertently transcribed by the computer software. Please disregards these errors.  Please excuse any errors that have escaped final proofreading.)       Cydney Mancera DO  09/24/23 1104

## 2023-09-25 ENCOUNTER — HOSPITAL ENCOUNTER (EMERGENCY)
Facility: HOSPITAL | Age: 88
Discharge: HOME | End: 2023-09-25
Attending: EMERGENCY MEDICINE
Payer: MEDICARE

## 2023-09-25 VITALS
HEART RATE: 64 BPM | BODY MASS INDEX: 25.76 KG/M2 | RESPIRATION RATE: 18 BRPM | DIASTOLIC BLOOD PRESSURE: 55 MMHG | HEIGHT: 62 IN | SYSTOLIC BLOOD PRESSURE: 116 MMHG | WEIGHT: 140 LBS | TEMPERATURE: 98.1 F | OXYGEN SATURATION: 97 %

## 2023-09-25 DIAGNOSIS — Z48.89 SUTURE CHECK: Primary | ICD-10-CM

## 2023-09-25 PROCEDURE — 99282 EMERGENCY DEPT VISIT SF MDM: CPT

## 2023-09-25 NOTE — ED TRIAGE NOTES
Patient had stitches yesterday and there is bleeding on the dressing, no uncontrolled bleeding in triage.